# Patient Record
Sex: FEMALE | Race: WHITE | Employment: OTHER | URBAN - METROPOLITAN AREA
[De-identification: names, ages, dates, MRNs, and addresses within clinical notes are randomized per-mention and may not be internally consistent; named-entity substitution may affect disease eponyms.]

---

## 2017-03-01 ENCOUNTER — TRANSFERRED RECORDS (OUTPATIENT)
Dept: HEALTH INFORMATION MANAGEMENT | Facility: CLINIC | Age: 57
End: 2017-03-01

## 2017-04-12 ENCOUNTER — TRANSFERRED RECORDS (OUTPATIENT)
Dept: HEALTH INFORMATION MANAGEMENT | Facility: CLINIC | Age: 57
End: 2017-04-12

## 2017-12-19 LAB
CREAT SERPL-MCNC: 0.99 MG/DL (ref 0.57–1.11)
GFR SERPL CREATININE-BSD FRML MDRD: 58 ML/MIN/1.73M2
GLUCOSE SERPL-MCNC: 86 MG/DL (ref 65–100)
POTASSIUM SERPL-SCNC: 4 MMOL/L (ref 3.5–5)

## 2017-12-23 ENCOUNTER — APPOINTMENT (OUTPATIENT)
Dept: CT IMAGING | Facility: CLINIC | Age: 57
End: 2017-12-23
Attending: EMERGENCY MEDICINE
Payer: COMMERCIAL

## 2017-12-23 ENCOUNTER — HOSPITAL ENCOUNTER (EMERGENCY)
Facility: CLINIC | Age: 57
Discharge: HOME | End: 2017-12-24
Attending: EMERGENCY MEDICINE | Admitting: EMERGENCY MEDICINE
Payer: COMMERCIAL

## 2017-12-23 DIAGNOSIS — A04.72 COLITIS DUE TO CLOSTRIDIUM DIFFICILE: ICD-10-CM

## 2017-12-23 DIAGNOSIS — R19.7 BLOODY DIARRHEA: ICD-10-CM

## 2017-12-23 LAB
ALBUMIN SERPL-MCNC: 3.5 G/DL (ref 3.4–5)
ALP SERPL-CCNC: 87 U/L (ref 40–150)
ALT SERPL W P-5'-P-CCNC: 22 U/L (ref 0–50)
ANION GAP SERPL CALCULATED.3IONS-SCNC: 3 MMOL/L (ref 3–14)
APTT PPP: 40 SEC (ref 22–37)
AST SERPL W P-5'-P-CCNC: 14 U/L (ref 0–45)
BASOPHILS # BLD AUTO: 0 10E9/L (ref 0–0.2)
BASOPHILS NFR BLD AUTO: 0.4 %
BILIRUB SERPL-MCNC: 0.2 MG/DL (ref 0.2–1.3)
BUN SERPL-MCNC: 13 MG/DL (ref 7–30)
CALCIUM SERPL-MCNC: 8.7 MG/DL (ref 8.5–10.1)
CHLORIDE SERPL-SCNC: 104 MMOL/L (ref 94–109)
CO2 SERPL-SCNC: 29 MMOL/L (ref 20–32)
CREAT SERPL-MCNC: 0.94 MG/DL (ref 0.52–1.04)
CRP SERPL-MCNC: 3.1 MG/L (ref 0–8)
DIFFERENTIAL METHOD BLD: NORMAL
EOSINOPHIL # BLD AUTO: 0.3 10E9/L (ref 0–0.7)
EOSINOPHIL NFR BLD AUTO: 3.4 %
ERYTHROCYTE [DISTWIDTH] IN BLOOD BY AUTOMATED COUNT: 14.8 % (ref 10–15)
GFR SERPL CREATININE-BSD FRML MDRD: 61 ML/MIN/1.7M2
GLUCOSE SERPL-MCNC: 91 MG/DL (ref 70–99)
HCT VFR BLD AUTO: 42.6 % (ref 35–47)
HGB BLD-MCNC: 14 G/DL (ref 11.7–15.7)
IMM GRANULOCYTES # BLD: 0 10E9/L (ref 0–0.4)
IMM GRANULOCYTES NFR BLD: 0.1 %
INR PPP: 0.88 (ref 0.86–1.14)
LIPASE SERPL-CCNC: 180 U/L (ref 73–393)
LYMPHOCYTES # BLD AUTO: 1.7 10E9/L (ref 0.8–5.3)
LYMPHOCYTES NFR BLD AUTO: 20.3 %
MCH RBC QN AUTO: 29.7 PG (ref 26.5–33)
MCHC RBC AUTO-ENTMCNC: 32.9 G/DL (ref 31.5–36.5)
MCV RBC AUTO: 90 FL (ref 78–100)
MONOCYTES # BLD AUTO: 0.8 10E9/L (ref 0–1.3)
MONOCYTES NFR BLD AUTO: 9 %
NEUTROPHILS # BLD AUTO: 5.6 10E9/L (ref 1.6–8.3)
NEUTROPHILS NFR BLD AUTO: 66.8 %
PLATELET # BLD AUTO: 180 10E9/L (ref 150–450)
POTASSIUM SERPL-SCNC: 3.5 MMOL/L (ref 3.4–5.3)
PROT SERPL-MCNC: 7.1 G/DL (ref 6.8–8.8)
RBC # BLD AUTO: 4.72 10E12/L (ref 3.8–5.2)
SODIUM SERPL-SCNC: 136 MMOL/L (ref 133–144)
WBC # BLD AUTO: 8.3 10E9/L (ref 4–11)

## 2017-12-23 PROCEDURE — 83690 ASSAY OF LIPASE: CPT | Performed by: EMERGENCY MEDICINE

## 2017-12-23 PROCEDURE — 80053 COMPREHEN METABOLIC PANEL: CPT | Performed by: EMERGENCY MEDICINE

## 2017-12-23 PROCEDURE — 25000128 H RX IP 250 OP 636: Performed by: EMERGENCY MEDICINE

## 2017-12-23 PROCEDURE — 96374 THER/PROPH/DIAG INJ IV PUSH: CPT | Performed by: EMERGENCY MEDICINE

## 2017-12-23 PROCEDURE — 87493 C DIFF AMPLIFIED PROBE: CPT | Performed by: EMERGENCY MEDICINE

## 2017-12-23 PROCEDURE — 99285 EMERGENCY DEPT VISIT HI MDM: CPT | Mod: Z6 | Performed by: EMERGENCY MEDICINE

## 2017-12-23 PROCEDURE — 85610 PROTHROMBIN TIME: CPT | Performed by: EMERGENCY MEDICINE

## 2017-12-23 PROCEDURE — 85730 THROMBOPLASTIN TIME PARTIAL: CPT | Performed by: EMERGENCY MEDICINE

## 2017-12-23 PROCEDURE — 86140 C-REACTIVE PROTEIN: CPT | Performed by: EMERGENCY MEDICINE

## 2017-12-23 PROCEDURE — 96375 TX/PRO/DX INJ NEW DRUG ADDON: CPT | Performed by: EMERGENCY MEDICINE

## 2017-12-23 PROCEDURE — 99285 EMERGENCY DEPT VISIT HI MDM: CPT | Mod: 25 | Performed by: EMERGENCY MEDICINE

## 2017-12-23 PROCEDURE — 85025 COMPLETE CBC W/AUTO DIFF WBC: CPT | Performed by: EMERGENCY MEDICINE

## 2017-12-23 PROCEDURE — 25000125 ZZHC RX 250: Performed by: EMERGENCY MEDICINE

## 2017-12-23 PROCEDURE — 74177 CT ABD & PELVIS W/CONTRAST: CPT

## 2017-12-23 RX ORDER — IOPAMIDOL 755 MG/ML
500 INJECTION, SOLUTION INTRAVASCULAR ONCE
Status: COMPLETED | OUTPATIENT
Start: 2017-12-23 | End: 2017-12-23

## 2017-12-23 RX ORDER — FENTANYL CITRATE 50 UG/ML
100 INJECTION, SOLUTION INTRAMUSCULAR; INTRAVENOUS
Status: DISCONTINUED | OUTPATIENT
Start: 2017-12-23 | End: 2017-12-24 | Stop reason: HOSPADM

## 2017-12-23 RX ORDER — ONDANSETRON 2 MG/ML
4 INJECTION INTRAMUSCULAR; INTRAVENOUS EVERY 30 MIN PRN
Status: DISCONTINUED | OUTPATIENT
Start: 2017-12-23 | End: 2017-12-24 | Stop reason: HOSPADM

## 2017-12-23 RX ADMIN — SODIUM CHLORIDE 60 ML: 9 INJECTION, SOLUTION INTRAVENOUS at 23:46

## 2017-12-23 RX ADMIN — IOPAMIDOL 100 ML: 755 INJECTION, SOLUTION INTRAVENOUS at 23:45

## 2017-12-23 RX ADMIN — ONDANSETRON 4 MG: 2 INJECTION INTRAMUSCULAR; INTRAVENOUS at 22:24

## 2017-12-23 RX ADMIN — FENTANYL CITRATE 100 MCG: 50 INJECTION, SOLUTION INTRAMUSCULAR; INTRAVENOUS at 22:26

## 2017-12-23 ASSESSMENT — ENCOUNTER SYMPTOMS
HEADACHES: 0
APPETITE CHANGE: 1
BLOOD IN STOOL: 1
NAUSEA: 1
FEVER: 1
ABDOMINAL PAIN: 1
DIARRHEA: 1

## 2017-12-23 NOTE — ED AVS SNAPSHOT
[facility] Emergency Department    [address] [place] [address]    Phone:  [phone]    Fax:  [phone]                                       Amisha Lerma   MRN: [id number]    Department:  [facility] Emergency Department   Date of Visit:  12/23/2017           After Visit Summary Signature Page     I have received my discharge instructions, and my questions have been answered. I have discussed any challenges I see with this plan with the nurse or doctor.    ..........................................................................................................................................  Patient/Patient Representative Signature      ..........................................................................................................................................  Patient Representative Print Name and Relationship to Patient    ..................................................               ................................................  Date                                            Time    ..........................................................................................................................................  Reviewed by Signature/Title    ...................................................              ..............................................  Date                                                            Time

## 2017-12-23 NOTE — ED AVS SNAPSHOT
[facility] Emergency Department    [address]     [place] [address]    Phone:  [phone]    Fax:  [phone]                                       Amisha Lerma   MRN: [id number]    Department:  [facility] Emergency Department   Date of Visit:  12/23/2017           Patient Information     Date Of Birth          1960        Your diagnoses for this visit were:     Bloody diarrhea        You were seen by Mendoza Kumar MD.      Follow-up Information     Schedule an appointment as soon as possible for a visit with Wilmer Gardner.    Specialty:  Family Practice    Contact information:    [address] [address]  [phone]          Discharge Instructions         Uncertain Causes of Diarrhea (Adult)    Diarrhea is when stools are loose and watery. This can be caused by:    Viral infections    Bacterial infections    Food poisoning    Parasites    Irritable bowel syndrome (IBS)    Inflammatory bowel diseases such as ulcerative colitis, Crohn's disease, and celiac disease    Food intolerance, such as to lactose, the sugar found in milk and milk products    Reaction to medicines like antibiotics, laxatives, cancer drugs, and antacids  Along with diarrhea, you may also have:    Abdominal pain and cramping    Nausea and vomiting    Loss of bowel control    Fever and chills    Bloody stools  In some cases, antibiotics may help to treat diarrhea. You may have a stool sample test. This is done to see what is causing your diarrhea, and if antibiotics will help treat it. The results of a stool sample test may take up to 2 days. The healthcare provider may not give you antibiotics until he or she has the stool test results.  Diarrhea can cause dehydration. This is the loss of too much water and other fluids from the body. When this occurs, body fluid must be replaced. This can be done with oral rehydration solutions. Oral rehydration solutions are  available at drugstores and grocery stores without a prescription.  Home care  Follow all instructions given by your healthcare provider. Rest at home for the next 24 hours, or until you feel better. Avoid caffeine, tobacco, and alcohol. These can make diarrhea, cramping, and pain worse.  If taking medicines:    Don t take over-the-counter diarrhea or nausea medicines unless your healthcare provider tells you to.    You may use acetaminophen or NSAID medicines like ibuprofen or naproxen to reduce pain and fever. Don t use these if you have chronic liver or kidney disease, or ever had a stomach ulcer or gastrointestinal bleeding. Don't use NSAID medicines if you are already taking one for another condition (like arthritis) or are on daily aspirin therapy (such as for heart disease or after a stroke). Talk with your healthcare provider first.    If antibiotics were prescribed, be sure you take them until they are finished. Don t stop taking them even when you feel better. Antibiotics must be taken as a full course.  To prevent the spread of illness:    Remember that washing with soap and water and using alcohol-based  is the best way to prevent the spread of infection.    Clean the toilet after each use.    Wash your hands before eating.    Wash your hands before and after preparing food. Keep in mind that people with diarrhea or vomiting should not prepare food for others.    Wash your hands after using cutting boards, countertops, and knives that have been in contact with raw foods.    Wash and then peel fruits and vegetables.    Keep uncooked meats away from cooked and ready-to-eat foods.    Use a food thermometer when cooking. Cook poultry to at least 165 F (74 C). Cook ground meat (beef, veal, pork, lamb) to at least 160 F (71 C). Cook fresh beef, veal, lamb, and pork to at least 145 F (63 C).    Don t eat raw or undercooked eggs (poached or cabrera side up), poultry, meat, or unpasteurized milk and  juices.  Food and drinks  The main goal while treating vomiting or diarrhea is to prevent dehydration. This is done by taking small amounts of liquids often.    Keep in mind that liquids are more important than food right now.    Drink only small amounts of liquids at a time.    Don t force yourself to eat, especially if you are having cramping, vomiting, or diarrhea. Don t eat large amounts at a time, even if you are hungry.    If you eat, avoid fatty, greasy, spicy, or fried foods.    Don t eat dairy foods or drink milk if you have diarrhea. These can make diarrhea worse.  During the first 24 hours you can try:    Oral rehydration solutions. Do not use sports drinks. They have too much sugar and not enough electrolytes.    Soft drinks without caffeine    Ginger ale    Water (plain or flavored)    Decaf tea or coffee    Clear broth, consommé, or bouillon    Gelatin, popsicles, or frozen fruit juice bars  The second 24 hours, if you are feeling better, you can add:    Hot cereal, plain toast, bread, rolls, or crackers    Plain noodles, rice, mashed potatoes, chicken noodle soup, or rice soup    Unsweetened canned fruit (no pineapple)    Bananas  As you recover:    Limit fat intake to less than 15 grams per day. Don t eat margarine, butter, oils, mayonnaise, sauces, gravies, fried foods, peanut butter, meat, poultry, or fish.    Limit fiber. Don t eat raw or cooked vegetables, fresh fruits except bananas, or bran cereals.    Limit caffeine and chocolate.    Limit dairy.    Don t use spices or seasonings except salt.    Go back to your normal diet over time, as you feel better and your symptoms improve.    If the symptoms come back, go back to a simple diet or clear liquids.  Follow-up care  Follow up with your healthcare provider, or as advised. If a stool sample was taken or cultures were done, call the healthcare provider for the results as instructed.  Call 911  Call 911 if you have any of these  symptoms:    Trouble breathing    Confusion    Extreme drowsiness or trouble walking    Loss of consciousness    Rapid heart rate    Chest pain    Stiff neck    Seizure  When to seek medical advice  Call your healthcare provider right away if any of these occur:    Abdominal pain that gets worse    Constant lower right abdominal pain    Continued vomiting and inability to keep liquids down    Diarrhea more than 5 times a day    Blood in vomit or stool    Dark urine or no urine for 8 hours, dry mouth and tongue, tiredness, weakness, or dizziness    Drowsiness    New rash    You don t get better in 2 to 3 days    Fever of 100.4 F (38 C) or higher that doesn t get lower with medicine  Date Last Reviewed: 1/3/2016    6293-1257 [employer]. [address] [place] [address], [place] [address]. All rights reserved. This information is not intended as a substitute for professional medical care. Always follow your healthcare professional's instructions.          24 Hour Appointment Hotline       To make an appointment at any [facility], call [phone] ([phone]). If you don't have a family doctor or clinic, we will help you find one. [facility] clinics are conveniently located to serve the needs of you and your family.          ED Discharge Orders     Enteric Bacteria and Virus Panel by KELLEN Stool                    Review of your medicines      START taking        Dose / Directions Last dose taken    oxyCODONE IR 5 MG tablet   Commonly known as:  ROXICODONE   Dose:  5 mg   Quantity:  10 tablet        Take 1 tablet (5 mg) by mouth every 6 hours as needed for pain   Refills:  0          Our records show that you are taking the medicines listed below. If these are incorrect, please call your family doctor or clinic.        Dose / Directions Last dose taken    ibuprofen 800 MG tablet   Commonly known as:  ADVIL/MOTRIN   Dose:  800 mg        Take 800 mg by mouth every 8 hours as needed.   Refills:  0         LEXAPRO 20 MG tablet   Dose:  20 mg   Generic drug:  escitalopram        Take 20 mg by mouth daily.   Refills:  0        LORazepam 1 MG tablet   Commonly known as:  ATIVAN   Dose:  1 mg        Take 1 mg by mouth 2 times daily as needed.   Refills:  0        OMEPRAZOLE PO   Dose:  20 mg        Take 20 mg by mouth every other day   Refills:  0        SYNTHROID 125 MCG tablet   Dose:  125 mcg   Generic drug:  levothyroxine        Take 125 mcg by mouth daily.   Refills:  0                Prescriptions were sent or printed at these locations (1 Prescription)                   [facility]   [address] [address]    Telephone:  [phone]   Fax:  [phone]   Hours:                  Printed at Department/Unit printer (1 of 1)         oxyCODONE IR (ROXICODONE) 5 MG tablet                Procedures and tests performed during your visit     CBC with platelets differential    CRP inflammation    CT Abdomen Pelvis w Contrast    Clostridium difficile toxin B PCR    Comprehensive metabolic panel    INR    Lipase    Partial thromboplastin time    Peripheral IV catheter    UA with Microscopic      Orders Needing Specimen Collection     Ordered          12/23/17 2144  Enteric Bacteria and Virus Panel by KELLEN Stool - STAT, [facility]: STAT, Needs to be Collected     Scheduled Task Status   12/23/17 2144 Collect Enteric Bacteria and Virus Panel by KELLEN Stool Open   Order Class:  PCU Collect                  Pending Results     Date and Time Order Name Status Description    12/23/2017 2144 CT Abdomen Pelvis w Contrast Preliminary     12/23/2017 2144 Clostridium difficile toxin B PCR In process             Pending Culture Results     Date and Time Order Name Status Description    12/23/2017 2144 Clostridium difficile toxin B PCR In process             Pending Results Instructions     If you had any lab results that were not finalized at the time of your Discharge, you can call the ED Lab  Result RN at [phone]. You will be contacted by this team for any positive Lab results or changes in treatment. The nurses are available 7 days a week from 10A to 6:30P.  You can leave a message 24 hours per day and they will return your call.        Thank you for choosing [facility]       Thank you for choosing [facility] for your care. Our goal is always to provide you with excellent care. Hearing back from our patients is one way we can continue to improve our services. Please take a few minutes to complete the written survey that you may receive in the mail after you visit with us. Thank you!        [employer] Information     [employer] gives you secure access to your electronic health record. If you see a primary care provider, you can also send messages to your care team and make appointments. If you have questions, please call your primary care clinic.  If you do not have a primary care provider, please call [phone] and they will assist you.        Care EveryWhere ID     This is your Care EveryWhere ID. This could be used by other organizations to access your [facility] medical records  [id number]        Equal Access to Services     NUNO AJ : Hadii fe Tran, walissette thomas, qaisaiah kagissell pollack, elaine cuevas . So [facility] [phone].    ATENCIÓN: Si habla español, tiene a perez disposición servicios gratuitos de asistencia lingüística. Llame al [phone].    We comply with applicable federal civil rights laws and [place] laws. We do not discriminate on the basis of race, color, national origin, age, disability, sex, sexual orientation, or gender identity.            After Visit Summary       This is your record. Keep this with you and show to your community pharmacist(s) and doctor(s) at your next visit.

## 2017-12-24 VITALS
OXYGEN SATURATION: 96 % | DIASTOLIC BLOOD PRESSURE: 81 MMHG | HEIGHT: 68 IN | TEMPERATURE: 98 F | BODY MASS INDEX: 33.34 KG/M2 | WEIGHT: 220 LBS | SYSTOLIC BLOOD PRESSURE: 149 MMHG | RESPIRATION RATE: 20 BRPM

## 2017-12-24 LAB
ALBUMIN UR-MCNC: NEGATIVE MG/DL
APPEARANCE UR: CLEAR
BILIRUB UR QL STRIP: NEGATIVE
C DIFF TOX B STL QL: POSITIVE
COLOR UR AUTO: COLORLESS
GLUCOSE UR STRIP-MCNC: NEGATIVE MG/DL
HGB UR QL STRIP: NEGATIVE
KETONES UR STRIP-MCNC: NEGATIVE MG/DL
LEUKOCYTE ESTERASE UR QL STRIP: NEGATIVE
MUCOUS THREADS #/AREA URNS LPF: PRESENT /LPF
NITRATE UR QL: NEGATIVE
PH UR STRIP: 5 PH (ref 5–7)
RBC #/AREA URNS AUTO: 0 /HPF (ref 0–2)
SOURCE: ABNORMAL
SP GR UR STRIP: 1.03 (ref 1–1.03)
SPECIMEN SOURCE: ABNORMAL
SQUAMOUS #/AREA URNS AUTO: <1 /HPF (ref 0–1)
UROBILINOGEN UR STRIP-MCNC: 0 MG/DL (ref 0–2)
WBC #/AREA URNS AUTO: 0 /HPF (ref 0–2)

## 2017-12-24 PROCEDURE — 81001 URINALYSIS AUTO W/SCOPE: CPT | Performed by: EMERGENCY MEDICINE

## 2017-12-24 PROCEDURE — 96376 TX/PRO/DX INJ SAME DRUG ADON: CPT | Performed by: EMERGENCY MEDICINE

## 2017-12-24 PROCEDURE — 25000128 H RX IP 250 OP 636: Performed by: EMERGENCY MEDICINE

## 2017-12-24 RX ORDER — VANCOMYCIN HYDROCHLORIDE 125 MG/1
125 CAPSULE ORAL 4 TIMES DAILY
Qty: 56 CAPSULE | Refills: 0 | Status: SHIPPED | OUTPATIENT
Start: 2017-12-24 | End: 2017-12-24 | Stop reason: CLARIF

## 2017-12-24 RX ORDER — OXYCODONE HYDROCHLORIDE 5 MG/1
5 TABLET ORAL EVERY 6 HOURS PRN
Qty: 10 TABLET | Refills: 0 | Status: SHIPPED | OUTPATIENT
Start: 2017-12-24 | End: 2018-07-19

## 2017-12-24 RX ADMIN — FENTANYL CITRATE 100 MCG: 50 INJECTION, SOLUTION INTRAMUSCULAR; INTRAVENOUS at 00:32

## 2017-12-24 NOTE — ED PROVIDER NOTES
History     Chief Complaint   Patient presents with     Abdominal Cramping     The history is provided by the patient and the spouse.     Amisha Lerma is a 57 year old female who presents for abdominal cramping for the past three weeks. Patient has a family medical history of colon cancer. She is experiencing cramping in her lower pelvic region that waxes and weans, and it is sickening for her. She feels flushed. There is no relation to when she is eating. Patient has not been hungry, but when she does get hungry she eats a small amount. She thought that she had the Flu, and so she went to the doctor at the beginning of December. Her doctor thought her symptoms were viral, but could be a sinus infection. She was given a Zpak that she is taking. Patient is also having diarrhea, nausea, and a low grade fever. Her nausea has waxed and weaned. She has noticed mucous in her diarrhea, and that it comes in different forms. It ranges from loose to formed. She will see mostly mucous with some bowel movement. She has noticed bright red blood in stools. Sometimes she will try to have a bowel movement, but cannot. Today, she had 10 small stools. She was seen by a CNP on 12-. Patient completed blood tests, and completed a stool sample kit that she turned in the next morning. She has noticed more blood in her bowel movements. Her last colonoscopy was 3 years ago. Patient got her test results from her appointment on 12- that were negative. She was directed to take pain reliever and use an antidiarrheal. Patient chose not to take the antidiarrheal, and the pain reliever is not helping. She was on antibiotics twice for a tooth infection, and once for a sinus infection recently where she was given a Zpak. She denies body aches and headaches.      Patient Active Problem List   Diagnosis     Acute appendicitis with peritoneal abscess     Fever and chills     Hypothyroidism     Major depression in complete remission  "(H)     Anxiety     Tobacco abuse     Abdominal abscess (H)     Acute appendicitis with generalized peritonitis     Family history of ischemic heart disease     Family history of colon cancer     Family history of skin cancer     Family history of high cholesterol     CARDIOVASCULAR SCREENING; LDL GOAL LESS THAN 160     Cholelithiasis     Splenomegaly     Acute bronchitis     Atypical chest pain     Past Medical History:   Diagnosis Date     Anxiety      Cholelithiasis 10/1/2013     Depressive disorder      Dysmenorrhea      Menometrorrhagia      Splenomegaly 10/1/2013     Substance abuse      Thyroid disease        Past Surgical History:   Procedure Laterality Date     ABDOMEN SURGERY       GYN SURGERY      ablation     LAPAROSCOPIC APPENDECTOMY  7/19/2012    Procedure: LAPAROSCOPIC APPENDECTOMY;  laparoscopic appendectomy, abscess drainage, lysis of adhesions;  Surgeon: Jeffrey Adair MD;  Location: [facility]     LAPAROSCOPIC LYSIS ADHESIONS  7/19/2012    Procedure: LAPAROSCOPIC LYSIS ADHESIONS;;  Surgeon: Jeffrey Adair MD;  Location: [facility] OR     ORTHOPEDIC SURGERY         Family History   Problem Relation Age of Onset     CANCER Brother      lung and brain       Social History   Substance Use Topics     Smoking status: Current Every Day Smoker     Packs/day: 1.00     Years: 30.00     Smokeless tobacco: Never Used     Alcohol use No      Comment: Alcohol free for 19 years          There is no immunization history on file for this patient.       Allergies   Allergen Reactions     Dilaudid [Hydromorphone Hcl]      Makes her \"pukey\" after surgery     Nickel        Current Outpatient Prescriptions   Medication Sig Dispense Refill     oxyCODONE IR (ROXICODONE) 5 MG tablet Take 1 tablet (5 mg) by mouth every 6 hours as needed for pain 10 tablet 0     vancomycin (VANOCIN) 50 mg/mL LIQD solution Take 2.5 mLs (125 mg) by mouth 4 times daily for 14 days 140 mL 0     OMEPRAZOLE PO Take 20 mg by mouth every other " "day       lorazepam (ATIVAN) 1 MG tablet Take 1 mg by mouth 2 times daily as needed.       ibuprofen (ADVIL,MOTRIN) 800 MG tablet Take 800 mg by mouth every 8 hours as needed.       escitalopram (LEXAPRO) 20 MG tablet Take 20 mg by mouth daily.       levothyroxine (SYNTHROID) 125 MCG tablet Take 125 mcg by mouth daily.       Review of Systems   Constitutional: Positive for appetite change and fever.        POSITIVE flushed   Gastrointestinal: Positive for abdominal pain (cramping in lower pelvic region), blood in stool, diarrhea and nausea.   Musculoskeletal:        NEGATIVE body aches   Neurological: Negative for headaches.   All other systems reviewed and are negative.      Physical Exam   BP: 149/81  Heart Rate: 79  Temp: 98  F (36.7  C)  Resp: 20  Height: 172.7 cm (5' 8\")  Weight: 99.8 kg (220 lb)  SpO2: 99 %      Physical Exam   Constitutional: She is oriented to person, place, and time. No distress.   HENT:   Head: Normocephalic and atraumatic.   Eyes: Conjunctivae and EOM are normal.   Neck: Normal range of motion.   Cardiovascular: Normal rate, regular rhythm, normal heart sounds and intact distal pulses.    No murmur heard.  Pulmonary/Chest: Effort normal and breath sounds normal. No respiratory distress. She has no wheezes. She has no rales. She exhibits no tenderness.   Abdominal: Soft. Bowel sounds are normal. She exhibits no distension. There is no tenderness.   Musculoskeletal: She exhibits no edema.   Neurological: She is alert and oriented to person, place, and time.   Skin: Skin is warm and dry. No rash noted. She is not diaphoretic. No pallor.   Psychiatric: She has a normal mood and affect. Her behavior is normal.   Nursing note and vitals reviewed.      ED Course     ED Course     Procedures          Results for orders placed or performed during the hospital encounter of 12/23/17 (from the past 48 hour(s))   CBC with platelets differential   Result Value Ref Range    WBC 8.3 4.0 - 11.0 10e9/L    " RBC Count 4.72 3.8 - 5.2 10e12/L    Hemoglobin 14.0 11.7 - 15.7 g/dL    Hematocrit 42.6 35.0 - 47.0 %    MCV 90 78 - 100 fl    MCH 29.7 26.5 - 33.0 pg    MCHC 32.9 31.5 - 36.5 g/dL    RDW 14.8 10.0 - 15.0 %    Platelet Count 180 150 - 450 10e9/L    Diff Method Automated Method     % Neutrophils 66.8 %    % Lymphocytes 20.3 %    % Monocytes 9.0 %    % Eosinophils 3.4 %    % Basophils 0.4 %    % Immature Granulocytes 0.1 %    Absolute Neutrophil 5.6 1.6 - 8.3 10e9/L    Absolute Lymphocytes 1.7 0.8 - 5.3 10e9/L    Absolute Monocytes 0.8 0.0 - 1.3 10e9/L    Absolute Eosinophils 0.3 0.0 - 0.7 10e9/L    Absolute Basophils 0.0 0.0 - 0.2 10e9/L    Abs Immature Granulocytes 0.0 0 - 0.4 10e9/L   Comprehensive metabolic panel   Result Value Ref Range    Sodium 136 133 - 144 mmol/L    Potassium 3.5 3.4 - 5.3 mmol/L    Chloride 104 94 - 109 mmol/L    Carbon Dioxide 29 20 - 32 mmol/L    Anion Gap 3 3 - 14 mmol/L    Glucose 91 70 - 99 mg/dL    Urea Nitrogen 13 7 - 30 mg/dL    Creatinine 0.94 0.52 - 1.04 mg/dL    GFR Estimate 61 >60 mL/min/1.7m2    GFR Estimate If Black 74 >60 mL/min/1.7m2    Calcium 8.7 8.5 - 10.1 mg/dL    Bilirubin Total 0.2 0.2 - 1.3 mg/dL    Albumin 3.5 3.4 - 5.0 g/dL    Protein Total 7.1 6.8 - 8.8 g/dL    Alkaline Phosphatase 87 40 - 150 U/L    ALT 22 0 - 50 U/L    AST 14 0 - 45 U/L   Lipase   Result Value Ref Range    Lipase 180 73 - 393 U/L   CRP inflammation   Result Value Ref Range    CRP Inflammation 3.1 0.0 - 8.0 mg/L   INR   Result Value Ref Range    INR 0.88 0.86 - 1.14   Partial thromboplastin time   Result Value Ref Range    PTT 40 (H) 22 - 37 sec   Clostridium difficile toxin B PCR   Result Value Ref Range    Specimen Description Feces     C Diff Toxin B PCR Positive (A) NEG^Negative   CT Abdomen Pelvis w Contrast    Narrative    CT ABDOMEN AND PELVIS WITH CONTRAST   12/23/2017 11:57 PM     HISTORY: Bloody diarrhea for 3 weeks.    COMPARISON: None.    TECHNIQUE: Following the uneventful  administration of 100ml Isovue 370  intravenous contrast, helical sections were acquired from the top of  the diaphragm through the pubic symphysis. Coronal reconstructions  were generated. Radiation dose for this scan was reduced using  automated exposure control, adjustment of the mA and/or kV according  to the patient's size, or iterative reconstruction technique.    FINDINGS:     Abdomen: The liver, spleen, pancreas, adrenal glands and kidneys are  unremarkable. A few gallstones in the gallbladder. No enlarged lymph  nodes or free fluid in the upper abdomen. Atherosclerotic  calcification in the abdominal aorta.    Scan through the lower chest is unremarkable.    Pelvis: The small and large bowel are normal in caliber. The appendix  is not visualized. No convincing bowel wall thickening, pneumatosis or  free intraperitoneal gas. The uterus is present. No enlarged lymph  nodes or free fluid in the pelvis.       Impression    IMPRESSION:   1. No cause of acute pain identified in the abdomen or pelvis.  2. Cholelithiasis.    LYUDMILA SHEIKH MD   UA with Microscopic   Result Value Ref Range    Color Urine Colorless     Appearance Urine Clear     Glucose Urine Negative NEG^Negative mg/dL    Bilirubin Urine Negative NEG^Negative    Ketones Urine Negative NEG^Negative mg/dL    Specific Gravity Urine 1.031 1.003 - 1.035    Blood Urine Negative NEG^Negative    pH Urine 5.0 5.0 - 7.0 pH    Protein Albumin Urine Negative NEG^Negative mg/dL    Urobilinogen mg/dL 0.0 0.0 - 2.0 mg/dL    Nitrite Urine Negative NEG^Negative    Leukocyte Esterase Urine Negative NEG^Negative    Source Midstream Urine     WBC Urine 0 0 - 2 /HPF    RBC Urine 0 0 - 2 /HPF    Squamous Epithelial /HPF Urine <1 0 - 1 /HPF    Mucous Urine Present (A) NEG^Negative /LPF              Results for orders placed or performed during the hospital encounter of 12/23/17 (from the past 24 hour(s))   CBC with platelets differential   Result Value Ref Range    WBC  8.3 4.0 - 11.0 10e9/L    RBC Count 4.72 3.8 - 5.2 10e12/L    Hemoglobin 14.0 11.7 - 15.7 g/dL    Hematocrit 42.6 35.0 - 47.0 %    MCV 90 78 - 100 fl    MCH 29.7 26.5 - 33.0 pg    MCHC 32.9 31.5 - 36.5 g/dL    RDW 14.8 10.0 - 15.0 %    Platelet Count 180 150 - 450 10e9/L    Diff Method Automated Method     % Neutrophils 66.8 %    % Lymphocytes 20.3 %    % Monocytes 9.0 %    % Eosinophils 3.4 %    % Basophils 0.4 %    % Immature Granulocytes 0.1 %    Absolute Neutrophil 5.6 1.6 - 8.3 10e9/L    Absolute Lymphocytes 1.7 0.8 - 5.3 10e9/L    Absolute Monocytes 0.8 0.0 - 1.3 10e9/L    Absolute Eosinophils 0.3 0.0 - 0.7 10e9/L    Absolute Basophils 0.0 0.0 - 0.2 10e9/L    Abs Immature Granulocytes 0.0 0 - 0.4 10e9/L   Comprehensive metabolic panel   Result Value Ref Range    Sodium 136 133 - 144 mmol/L    Potassium 3.5 3.4 - 5.3 mmol/L    Chloride 104 94 - 109 mmol/L    Carbon Dioxide 29 20 - 32 mmol/L    Anion Gap 3 3 - 14 mmol/L    Glucose 91 70 - 99 mg/dL    Urea Nitrogen 13 7 - 30 mg/dL    Creatinine 0.94 0.52 - 1.04 mg/dL    GFR Estimate 61 >60 mL/min/1.7m2    GFR Estimate If Black 74 >60 mL/min/1.7m2    Calcium 8.7 8.5 - 10.1 mg/dL    Bilirubin Total 0.2 0.2 - 1.3 mg/dL    Albumin 3.5 3.4 - 5.0 g/dL    Protein Total 7.1 6.8 - 8.8 g/dL    Alkaline Phosphatase 87 40 - 150 U/L    ALT 22 0 - 50 U/L    AST 14 0 - 45 U/L   Lipase   Result Value Ref Range    Lipase 180 73 - 393 U/L   CRP inflammation   Result Value Ref Range    CRP Inflammation 3.1 0.0 - 8.0 mg/L   INR   Result Value Ref Range    INR 0.88 0.86 - 1.14   Partial thromboplastin time   Result Value Ref Range    PTT 40 (H) 22 - 37 sec   Clostridium difficile toxin B PCR   Result Value Ref Range    Specimen Description Feces     C Diff Toxin B PCR Positive (A) NEG^Negative   CT Abdomen Pelvis w Contrast    Narrative    CT ABDOMEN AND PELVIS WITH CONTRAST   12/23/2017 11:57 PM     HISTORY: Bloody diarrhea for 3 weeks.    COMPARISON: None.    TECHNIQUE: Following  the uneventful administration of 100ml Isovue 370  intravenous contrast, helical sections were acquired from the top of  the diaphragm through the pubic symphysis. Coronal reconstructions  were generated. Radiation dose for this scan was reduced using  automated exposure control, adjustment of the mA and/or kV according  to the patient's size, or iterative reconstruction technique.    FINDINGS:     Abdomen: The liver, spleen, pancreas, adrenal glands and kidneys are  unremarkable. A few gallstones in the gallbladder. No enlarged lymph  nodes or free fluid in the upper abdomen. Atherosclerotic  calcification in the abdominal aorta.    Scan through the lower chest is unremarkable.    Pelvis: The small and large bowel are normal in caliber. The appendix  is not visualized. No convincing bowel wall thickening, pneumatosis or  free intraperitoneal gas. The uterus is present. No enlarged lymph  nodes or free fluid in the pelvis.       Impression    IMPRESSION:   1. No cause of acute pain identified in the abdomen or pelvis.  2. Cholelithiasis.    LYUDMILA SHEIKH MD   UA with Microscopic   Result Value Ref Range    Color Urine Colorless     Appearance Urine Clear     Glucose Urine Negative NEG^Negative mg/dL    Bilirubin Urine Negative NEG^Negative    Ketones Urine Negative NEG^Negative mg/dL    Specific Gravity Urine 1.031 1.003 - 1.035    Blood Urine Negative NEG^Negative    pH Urine 5.0 5.0 - 7.0 pH    Protein Albumin Urine Negative NEG^Negative mg/dL    Urobilinogen mg/dL 0.0 0.0 - 2.0 mg/dL    Nitrite Urine Negative NEG^Negative    Leukocyte Esterase Urine Negative NEG^Negative    Source Midstream Urine     WBC Urine 0 0 - 2 /HPF    RBC Urine 0 0 - 2 /HPF    Squamous Epithelial /HPF Urine <1 0 - 1 /HPF    Mucous Urine Present (A) NEG^Negative /LPF       Medications   iohexol (OMNIPAQUE) solution 50 mL (50 mLs Oral Given 12/23/17 2211)   sodium chloride 0.9 % bag 500mL for CT scan flush use (60 mLs Intravenous Given  12/23/17 2346)   iopamidol (ISOVUE-370) solution 500 mL (100 mLs Intravenous Given 12/23/17 2345)   sodium chloride (PF) 0.9% PF flush 3 mL (10 mLs Intracatheter Given 12/23/17 2344)         Assessments & Plan (with Medical Decision Making)  Amisha Lerma is a 57-year-old female to the ED for evaluation of ongoing diarrhea which at times is bloody that started around the beginning of December of this year.  Patient was recently on a course of antibiotics (azithromycin) for sinus infection just prior to the onset of diarrhea.  On average, she is reportedly had 7-10 loose bowel movements a day, more recently she has had bloody diarrhea.  She complains of intense cramping throughout the abdomen but not associated with eating or movement.  She was seen by her primary care provider at [facility] in [place] where she had labs and stool studies done.  It appears the only stool study that was done was Campylobacter which was negative.  On exam here, I am unable to find any significant abnormalities including abdominal pain with any palpation, hyperactive or hypoactive bowel sounds, no guarding or rebound tenderness.  Overall, her exam is unremarkable.  Labs were obtained and a normal CBC and comprehensive metabolic panel.  Her lipase, PT/INR, and CRP are normal.  Her PTT is elevated at 40.  Urinalysis is unremarkable.  A CT of the abdomen and pelvis was performed demonstrating cholelithiasis without cholecystitis with the remainder of her abdomen appearing to be benign and no identifiable abnormalities to account for her abdominal pain.  Her C. difficile toxin returns positive signifying that she has C. difficile colitis.  We will started on oral vancomycin liquid 125 mg per 5 mL with a dose of 5 mL by mouth 4 times a day for the next 14 days.  In addition, we put her on a short course of oxycodone for pain control.  She will need to follow-up with her primary care provider at the completion of the antibiotics for  retesting of C. difficile toxin to ensure that we cleared her from this infection.  Patient understands the plan and is suitable for discharge in satisfactory condition.     I have reviewed the nursing notes.    I have reviewed the findings, diagnosis, plan and need for follow up with the patient.       Discharge Medication List as of 12/24/2017  2:00 AM      START taking these medications    Details   oxyCODONE IR (ROXICODONE) 5 MG tablet Take 1 tablet (5 mg) by mouth every 6 hours as needed for pain, Disp-10 tablet, R-0, Local Print             Final diagnoses:   Bloody diarrhea   Colitis due to Clostridium difficile     This document serves as a record of services personally performed by Mendoza Kumar MD. It was created on their behalf by Kellie Michelle, a trained medical scribe. The creation of this record is based on the provider's personal observations and the statements of the patient. This document has been checked and approved by the attending provider.    Note: Chart documentation done in part with Dragon Voice Recognition software. Although reviewed after completion, some word and grammatical errors may remain.    12/23/2017   [facility] EMERGENCY DEPARTMENT     Mendoza Kumar MD  12/24/17 0507

## 2017-12-24 NOTE — DISCHARGE INSTRUCTIONS
Uncertain Causes of Diarrhea (Adult)    Diarrhea is when stools are loose and watery. This can be caused by:    Viral infections    Bacterial infections    Food poisoning    Parasites    Irritable bowel syndrome (IBS)    Inflammatory bowel diseases such as ulcerative colitis, Crohn's disease, and celiac disease    Food intolerance, such as to lactose, the sugar found in milk and milk products    Reaction to medicines like antibiotics, laxatives, cancer drugs, and antacids  Along with diarrhea, you may also have:    Abdominal pain and cramping    Nausea and vomiting    Loss of bowel control    Fever and chills    Bloody stools  In some cases, antibiotics may help to treat diarrhea. You may have a stool sample test. This is done to see what is causing your diarrhea, and if antibiotics will help treat it. The results of a stool sample test may take up to 2 days. The healthcare provider may not give you antibiotics until he or she has the stool test results.  Diarrhea can cause dehydration. This is the loss of too much water and other fluids from the body. When this occurs, body fluid must be replaced. This can be done with oral rehydration solutions. Oral rehydration solutions are available at drugstores and grocery stores without a prescription.  Home care  Follow all instructions given by your healthcare provider. Rest at home for the next 24 hours, or until you feel better. Avoid caffeine, tobacco, and alcohol. These can make diarrhea, cramping, and pain worse.  If taking medicines:    Don t take over-the-counter diarrhea or nausea medicines unless your healthcare provider tells you to.    You may use acetaminophen or NSAID medicines like ibuprofen or naproxen to reduce pain and fever. Don t use these if you have chronic liver or kidney disease, or ever had a stomach ulcer or gastrointestinal bleeding. Don't use NSAID medicines if you are already taking one for another condition (like arthritis) or are on daily  aspirin therapy (such as for heart disease or after a stroke). Talk with your healthcare provider first.    If antibiotics were prescribed, be sure you take them until they are finished. Don t stop taking them even when you feel better. Antibiotics must be taken as a full course.  To prevent the spread of illness:    Remember that washing with soap and water and using alcohol-based  is the best way to prevent the spread of infection.    Clean the toilet after each use.    Wash your hands before eating.    Wash your hands before and after preparing food. Keep in mind that people with diarrhea or vomiting should not prepare food for others.    Wash your hands after using cutting boards, countertops, and knives that have been in contact with raw foods.    Wash and then peel fruits and vegetables.    Keep uncooked meats away from cooked and ready-to-eat foods.    Use a food thermometer when cooking. Cook poultry to at least 165 F (74 C). Cook ground meat (beef, veal, pork, lamb) to at least 160 F (71 C). Cook fresh beef, veal, lamb, and pork to at least 145 F (63 C).    Don t eat raw or undercooked eggs (poached or cabrera side up), poultry, meat, or unpasteurized milk and juices.  Food and drinks  The main goal while treating vomiting or diarrhea is to prevent dehydration. This is done by taking small amounts of liquids often.    Keep in mind that liquids are more important than food right now.    Drink only small amounts of liquids at a time.    Don t force yourself to eat, especially if you are having cramping, vomiting, or diarrhea. Don t eat large amounts at a time, even if you are hungry.    If you eat, avoid fatty, greasy, spicy, or fried foods.    Don t eat dairy foods or drink milk if you have diarrhea. These can make diarrhea worse.  During the first 24 hours you can try:    Oral rehydration solutions. Do not use sports drinks. They have too much sugar and not enough electrolytes.    Soft drinks without  caffeine    Ginger ale    Water (plain or flavored)    Decaf tea or coffee    Clear broth, consommé, or bouillon    Gelatin, popsicles, or frozen fruit juice bars  The second 24 hours, if you are feeling better, you can add:    Hot cereal, plain toast, bread, rolls, or crackers    Plain noodles, rice, mashed potatoes, chicken noodle soup, or rice soup    Unsweetened canned fruit (no pineapple)    Bananas  As you recover:    Limit fat intake to less than 15 grams per day. Don t eat margarine, butter, oils, mayonnaise, sauces, gravies, fried foods, peanut butter, meat, poultry, or fish.    Limit fiber. Don t eat raw or cooked vegetables, fresh fruits except bananas, or bran cereals.    Limit caffeine and chocolate.    Limit dairy.    Don t use spices or seasonings except salt.    Go back to your normal diet over time, as you feel better and your symptoms improve.    If the symptoms come back, go back to a simple diet or clear liquids.  Follow-up care  Follow up with your healthcare provider, or as advised. If a stool sample was taken or cultures were done, call the healthcare provider for the results as instructed.  Call 911  Call 911 if you have any of these symptoms:    Trouble breathing    Confusion    Extreme drowsiness or trouble walking    Loss of consciousness    Rapid heart rate    Chest pain    Stiff neck    Seizure  When to seek medical advice  Call your healthcare provider right away if any of these occur:    Abdominal pain that gets worse    Constant lower right abdominal pain    Continued vomiting and inability to keep liquids down    Diarrhea more than 5 times a day    Blood in vomit or stool    Dark urine or no urine for 8 hours, dry mouth and tongue, tiredness, weakness, or dizziness    Drowsiness    New rash    You don t get better in 2 to 3 days    Fever of 100.4 F (38 C) or higher that doesn t get lower with medicine  Date Last Reviewed: 1/3/2016    3112-9393 [employer]. [address] [place] [address]  [place] [address]. All rights reserved. This information is not intended as a substitute for professional medical care. Always follow your healthcare professional's instructions.

## 2017-12-24 NOTE — ED NOTES
IV removed by pt.  Sent home a stool sample kit and instructions for pt collect. Reviewed discharge instructions with pt.  No additional questions or concerns.

## 2017-12-24 NOTE — ED NOTES
Pt reports abd cramping and diarrhea that started the beginning of December, has been to the doctor multiple times without improvement. Reports she has also been having bright red blood with stools. Over the past month has been on an antibiotic. States today she has had about 10 small soft stools.

## 2017-12-29 ENCOUNTER — TELEPHONE (OUTPATIENT)
Dept: FAMILY MEDICINE | Facility: CLINIC | Age: 57
End: 2017-12-29

## 2017-12-29 ENCOUNTER — OFFICE VISIT (OUTPATIENT)
Dept: FAMILY MEDICINE | Facility: CLINIC | Age: 57
End: 2017-12-29
Payer: COMMERCIAL

## 2017-12-29 VITALS
HEIGHT: 68 IN | WEIGHT: 216.5 LBS | OXYGEN SATURATION: 100 % | TEMPERATURE: 97.5 F | DIASTOLIC BLOOD PRESSURE: 76 MMHG | HEART RATE: 91 BPM | SYSTOLIC BLOOD PRESSURE: 116 MMHG | BODY MASS INDEX: 32.81 KG/M2

## 2017-12-29 DIAGNOSIS — A04.72 C. DIFFICILE COLITIS: Primary | ICD-10-CM

## 2017-12-29 PROCEDURE — 99214 OFFICE O/P EST MOD 30 MIN: CPT | Performed by: FAMILY MEDICINE

## 2017-12-29 RX ORDER — DICYCLOMINE HCL 20 MG
20 TABLET ORAL 4 TIMES DAILY PRN
Qty: 20 TABLET | Refills: 1 | Status: SHIPPED | OUTPATIENT
Start: 2017-12-29 | End: 2018-07-19

## 2017-12-29 RX ORDER — OXYCODONE AND ACETAMINOPHEN 5; 325 MG/1; MG/1
1 TABLET ORAL EVERY 4 HOURS PRN
Qty: 20 TABLET | Refills: 0 | Status: SHIPPED | OUTPATIENT
Start: 2017-12-29 | End: 2018-01-02 | Stop reason: SINTOL

## 2017-12-29 NOTE — PROGRESS NOTES
SUBJECTIVE:   Amisha Lerma is a 57 year old female who presents to clinic today for the following health issues:  Patient was seen 12/23/2017 at our ED and Dx with C. Diff. Started on a 14 day course   Of vancomycin and instructed to follow up after that.    Patient is still having the ABD pain today. Patient c/o constipation. She is still taking the Vancomycin.     ED/UC Followup:    Facility:  [facility]  Date of visit: 12/23/2017  Reason for visit: C. Diff   Current Status: follow up              Problem list and histories reviewed & adjusted, as indicated.  Additional history: as documented        Reviewed and updated as needed this visit by clinical staff       Reviewed and updated as needed this visit by Provider        SUBJECTIVE:  Amisha  is a 57 year old female who presents for: Follow-up of her emergency room visit she is being treated for C. difficile diarrhea.  Her diarrhea is better.  She still having some abdominal discomfort.  She is in the middle of a 14 day course of vancomycin.  Emergency room report was reviewed and she has had trouble with diarrhea since the beginning of December.  She was on courses of antibiotics just prior to the diarrhea onset.  She is eating now.  Even reports some constipation.    Past Medical History:   Diagnosis Date     Anxiety      Cholelithiasis 10/1/2013     Depressive disorder      Dysmenorrhea      Menometrorrhagia      Splenomegaly 10/1/2013     Substance abuse      Thyroid disease      Past Surgical History:   Procedure Laterality Date     ABDOMEN SURGERY       GYN SURGERY      ablation     LAPAROSCOPIC APPENDECTOMY  7/19/2012    Procedure: LAPAROSCOPIC APPENDECTOMY;  laparoscopic appendectomy, abscess drainage, lysis of adhesions;  Surgeon: Jeffrey Adair MD;  Location:  OR     LAPAROSCOPIC LYSIS ADHESIONS  7/19/2012    Procedure: LAPAROSCOPIC LYSIS ADHESIONS;;  Surgeon: Jeffrey Adair MD;  Location:  OR     ORTHOPEDIC SURGERY       Social  "History   Substance Use Topics     Smoking status: Current Every Day Smoker     Packs/day: 1.00     Years: 30.00     Smokeless tobacco: Never Used     Alcohol use No      Comment: Alcohol free for 19 years     Current Outpatient Prescriptions   Medication Sig Dispense Refill     dicyclomine (BENTYL) 20 MG tablet Take 1 tablet (20 mg) by mouth 4 times daily as needed 20 tablet 1     vancomycin (VANOCIN) 50 mg/mL LIQD solution Take 2.5 mLs (125 mg) by mouth 4 times daily for 14 days 140 mL 0     OMEPRAZOLE PO Take 20 mg by mouth every other day       lorazepam (ATIVAN) 1 MG tablet Take 1 mg by mouth 2 times daily as needed.       ibuprofen (ADVIL,MOTRIN) 800 MG tablet Take 800 mg by mouth every 8 hours as needed.       escitalopram (LEXAPRO) 20 MG tablet Take 20 mg by mouth daily.       levothyroxine (SYNTHROID) 125 MCG tablet Take 125 mcg by mouth daily.       oxyCODONE IR (ROXICODONE) 5 MG tablet Take 1 tablet (5 mg) by mouth every 6 hours as needed for pain (Patient not taking: Reported on 12/29/2017) 10 tablet 0       REVIEW OF SYSTEMS:   5 point ROS negative except as noted above in HPI, including Gen., Resp, CV, GI &  system review.     OBJECTIVE:  Vitals: /76 (BP Location: Right arm, Patient Position: Chair, Cuff Size: Adult Large)  Pulse 91  Temp 97.5  F (36.4  C) (Temporal)  Ht 5' 8\" (1.727 m)  Wt 216 lb 8 oz (98.2 kg)  SpO2 100%  BMI 32.92 kg/m2  BMI= Body mass index is 32.92 kg/(m^2).  She appears in no distress.  Mucous membranes are moist.  Neck supple no adenopathy.  Lungs are clear.  Heart regular rhythm no murmur.  Abdomen soft bowel sounds present just some generalized tenderness to palpation no masses no distention.  Skin is clear.  Extremities normal.  Reviewed labs.  Reviewed CT scan.    ASSESSMENT:  C. difficile diarrhea #2 abdominal pain    PLAN:  Continue on her vancomycin.  She was offered some Percocet for pain but stated this made her sick.  So we had to change it to Vicodin " apparently she can tolerate better.  She is to follow up with the end of her course of vancomycin to recheck stool samples.  Report back of worsening.  Her vitals and physical exam were unremarkable today.        Jeffrey Gardner MD  [facility]

## 2017-12-29 NOTE — NURSING NOTE
"Chief Complaint   Patient presents with     RECHECK     C. Diff        Initial /76 (BP Location: Right arm, Patient Position: Chair, Cuff Size: Adult Large)  Pulse 91  Temp 97.5  F (36.4  C) (Temporal)  Ht 5' 8\" (1.727 m)  Wt 216 lb 8 oz (98.2 kg)  SpO2 100%  BMI 32.92 kg/m2 Estimated body mass index is 32.92 kg/(m^2) as calculated from the following:    Height as of this encounter: 5' 8\" (1.727 m).    Weight as of this encounter: 216 lb 8 oz (98.2 kg).  Medication Reconciliation: complete  "

## 2017-12-29 NOTE — MR AVS SNAPSHOT
"              After Visit Summary   12/29/2017    Amisha Lerma    MRN: [id number]           Patient Information     Date Of Birth          1960        Visit Information        Provider Department      12/29/2017 2:40 PM Jeffrey Gardner MD [facility]        Today's Diagnoses     C. difficile colitis    -  1       Follow-ups after your visit        Who to contact     If you have questions or need follow up information about today's clinic visit or your schedule please contact [facility] directly at [phone].  Normal or non-critical lab and imaging results will be communicated to you by MyChart, letter or phone within 4 business days after the clinic has received the results. If you do not hear from us within 7 days, please contact the clinic through [employer] or phone. If you have a critical or abnormal lab result, we will notify you by phone as soon as possible.  Submit refill requests through [employer] or call your pharmacy and they will forward the refill request to us. Please allow 3 business days for your refill to be completed.          Additional Information About Your Visit        MyChart Information     [employer] gives you secure access to your electronic health record. If you see a primary care provider, you can also send messages to your care team and make appointments. If you have questions, please call your primary care clinic.  If you do not have a primary care provider, please call [phone] and they will assist you.        Care EveryWhere ID     This is your Care EveryWhere ID. This could be used by other organizations to access your [facility] medical records  [id number]        Your Vitals Were     Pulse Temperature Height Pulse Oximetry BMI (Body Mass Index)       91 97.5  F (36.4  C) (Temporal) 5' 8\" (1.727 m) 100% 32.92 kg/m2        Blood Pressure from Last 3 Encounters:   12/29/17 116/76   12/23/17 149/81   02/23/16 108/66    Weight from Last 3 Encounters: "   12/29/17 216 lb 8 oz (98.2 kg)   12/23/17 220 lb (99.8 kg)   01/02/15 217 lb (98.4 kg)              Today, you had the following     No orders found for display         Today's Medication Changes          These changes are accurate as of: 12/29/17 11:59 PM.  If you have any questions, ask your nurse or doctor.               Start taking these medicines.        Dose/Directions    dicyclomine 20 MG tablet   Commonly known as:  BENTYL   Used for:  C. difficile colitis   Started by:  Jeffrey Gardner MD        Dose:  20 mg   Take 1 tablet (20 mg) by mouth 4 times daily as needed   Quantity:  20 tablet   Refills:  1       HYDROcodone-acetaminophen 5-325 MG per tablet   Commonly known as:  NORCO   Used for:  C. difficile colitis   Started by:  Jeffrey Gardner MD        Dose:  1-2 tablet   Take 1-2 tablets by mouth every 4 hours as needed for moderate to severe pain   Quantity:  20 tablet   Refills:  0            Where to get your medicines      Some of these will need a paper prescription and others can be bought over the counter.  Ask your nurse if you have questions.     Bring a paper prescription for each of these medications     dicyclomine 20 MG tablet    HYDROcodone-acetaminophen 5-325 MG per tablet                Primary Care Provider Office Phone # Fax #    Wilmer Gardner [phone]       [place] [address]        Equal Access to Services     VENICE AJ AH: Hadii fe ku hadasho Soomaali, waaxda luqadaha, qaybta kaalmada adeegyada, elaine gonzalez. So [facility] [phone].    ATENCIÓN: Si habla español, tiene a perez disposición servicios gratuitos de asistencia lingüística. Llame al [phone].    We comply with applicable federal civil rights laws and [place] laws. We do not discriminate on the basis of race, color, national origin, age, disability, sex, sexual orientation, or gender identity.            Thank you!     Thank you for  choosing [facility]  for your care. Our goal is always to provide you with excellent care. Hearing back from our patients is one way we can continue to improve our services. Please take a few minutes to complete the written survey that you may receive in the mail after your visit with us. Thank you!             Your Updated Medication List - Protect others around you: Learn how to safely use, store and throw away your medicines at www.disposemymeds.org.          This list is accurate as of: 12/29/17 11:59 PM.  Always use your most recent med list.                   Brand Name Dispense Instructions for use Diagnosis    dicyclomine 20 MG tablet    BENTYL    20 tablet    Take 1 tablet (20 mg) by mouth 4 times daily as needed    C. difficile colitis       HYDROcodone-acetaminophen 5-325 MG per tablet    NORCO    20 tablet    Take 1-2 tablets by mouth every 4 hours as needed for moderate to severe pain    C. difficile colitis       ibuprofen 800 MG tablet    ADVIL/MOTRIN     Take 800 mg by mouth every 8 hours as needed.        LEXAPRO 20 MG tablet   Generic drug:  escitalopram      Take 20 mg by mouth daily.        LORazepam 1 MG tablet    ATIVAN     Take 1 mg by mouth 2 times daily as needed.        OMEPRAZOLE PO      Take 20 mg by mouth every other day        oxyCODONE IR 5 MG tablet    ROXICODONE    10 tablet    Take 1 tablet (5 mg) by mouth every 6 hours as needed for pain        SYNTHROID 125 MCG tablet   Generic drug:  levothyroxine      Take 125 mcg by mouth daily.        vancomycin 50 mg/mL Liqd solution    VANOCIN    140 mL    Take 2.5 mLs (125 mg) by mouth 4 times daily for 14 days

## 2017-12-30 NOTE — TELEPHONE ENCOUNTER
Reason for call:  Other   Patient called regarding (reason for call): Patient calling regarding visit today, medication that prescribe, (oxycodone) is upsetting pt stomach. She would like to try the Vicodin, In the past Vicodin has been successful.    Additional comments: No.      Phone number to reach patient:  Home number on file [phone] (home)    Best Time:  Anytime    Can we leave a detailed message on this number?  YES

## 2018-01-02 RX ORDER — HYDROCODONE BITARTRATE AND ACETAMINOPHEN 5; 325 MG/1; MG/1
1-2 TABLET ORAL EVERY 4 HOURS PRN
Qty: 20 TABLET | Refills: 0 | Status: SHIPPED | OUTPATIENT
Start: 2018-01-02 | End: 2018-07-19

## 2018-01-02 NOTE — TELEPHONE ENCOUNTER
Amisha called back stating that should would like to use the [facility]-also stated that her  Jonatan will be picking this up

## 2018-01-02 NOTE — TELEPHONE ENCOUNTER
Lm for Amisha to return my to let us know what pharmacy she wants to use.  If has to mailed then this can take up to a week to get.

## 2018-03-03 ENCOUNTER — HEALTH MAINTENANCE LETTER (OUTPATIENT)
Age: 58
End: 2018-03-03

## 2018-04-03 LAB
CREAT SERPL-MCNC: 0.98 MG/DL (ref 0.57–1.11)
GFR SERPL CREATININE-BSD FRML MDRD: 58 ML/MIN/1.73M2
TSH SERPL-ACNC: 2.58 UIU/ML (ref 0.35–4.94)

## 2018-05-07 LAB — PHQ9 SCORE: 12

## 2018-07-10 ENCOUNTER — HOSPITAL ENCOUNTER (EMERGENCY)
Facility: CLINIC | Age: 58
Discharge: HOME | End: 2018-07-11
Attending: PHYSICIAN ASSISTANT | Admitting: PHYSICIAN ASSISTANT
Payer: COMMERCIAL

## 2018-07-10 ENCOUNTER — APPOINTMENT (OUTPATIENT)
Dept: ULTRASOUND IMAGING | Facility: CLINIC | Age: 58
End: 2018-07-10
Attending: PHYSICIAN ASSISTANT
Payer: COMMERCIAL

## 2018-07-10 DIAGNOSIS — R10.2 PELVIC CRAMPING: ICD-10-CM

## 2018-07-10 LAB
ALBUMIN UR-MCNC: NEGATIVE MG/DL
ANION GAP SERPL CALCULATED.3IONS-SCNC: 8 MMOL/L (ref 3–14)
APPEARANCE UR: CLEAR
BASOPHILS # BLD AUTO: 0 10E9/L (ref 0–0.2)
BASOPHILS NFR BLD AUTO: 0.6 %
BILIRUB UR QL STRIP: NEGATIVE
BUN SERPL-MCNC: 18 MG/DL (ref 7–30)
CALCIUM SERPL-MCNC: 8.6 MG/DL (ref 8.5–10.1)
CHLORIDE SERPL-SCNC: 102 MMOL/L (ref 94–109)
CO2 SERPL-SCNC: 27 MMOL/L (ref 20–32)
COLOR UR AUTO: NORMAL
CREAT SERPL-MCNC: 0.91 MG/DL (ref 0.52–1.04)
DIFFERENTIAL METHOD BLD: ABNORMAL
EOSINOPHIL NFR BLD AUTO: 3.6 %
ERYTHROCYTE [DISTWIDTH] IN BLOOD BY AUTOMATED COUNT: 14.3 % (ref 10–15)
GFR SERPL CREATININE-BSD FRML MDRD: 63 ML/MIN/1.7M2
GLUCOSE SERPL-MCNC: 91 MG/DL (ref 70–99)
GLUCOSE UR STRIP-MCNC: NEGATIVE MG/DL
HCT VFR BLD AUTO: 44 % (ref 35–47)
HGB BLD-MCNC: 14.7 G/DL (ref 11.7–15.7)
HGB UR QL STRIP: NEGATIVE
IMM GRANULOCYTES # BLD: 0 10E9/L (ref 0–0.4)
IMM GRANULOCYTES NFR BLD: 0.2 %
KETONES UR STRIP-MCNC: NEGATIVE MG/DL
LEUKOCYTE ESTERASE UR QL STRIP: NEGATIVE
LYMPHOCYTES # BLD AUTO: 0.9 10E9/L (ref 0.8–5.3)
LYMPHOCYTES NFR BLD AUTO: 19.8 %
MCH RBC QN AUTO: 29.3 PG (ref 26.5–33)
MCHC RBC AUTO-ENTMCNC: 33.4 G/DL (ref 31.5–36.5)
MCV RBC AUTO: 88 FL (ref 78–100)
MONOCYTES # BLD AUTO: 0.8 10E9/L (ref 0–1.3)
MONOCYTES NFR BLD AUTO: 16.2 %
NEUTROPHILS # BLD AUTO: 2.8 10E9/L (ref 1.6–8.3)
NEUTROPHILS NFR BLD AUTO: 59.6 %
NITRATE UR QL: NEGATIVE
NRBC # BLD AUTO: 0 10*3/UL
NRBC BLD AUTO-RTO: 0 /100
PH UR STRIP: 5 PH (ref 5–7)
PLATELET # BLD AUTO: 111 10E9/L (ref 150–450)
POTASSIUM SERPL-SCNC: 3.9 MMOL/L (ref 3.4–5.3)
RBC # BLD AUTO: 5.01 10E12/L (ref 3.8–5.2)
RBC #/AREA URNS AUTO: <1 /HPF (ref 0–2)
SODIUM SERPL-SCNC: 137 MMOL/L (ref 133–144)
SOURCE: NORMAL
SP GR UR STRIP: 1 (ref 1–1.03)
SQUAMOUS #/AREA URNS AUTO: 1 /HPF (ref 0–1)
UROBILINOGEN UR STRIP-MCNC: 0 MG/DL (ref 0–2)
WBC # BLD AUTO: 4.8 10E9/L (ref 4–11)
WBC #/AREA URNS AUTO: <1 /HPF (ref 0–5)

## 2018-07-10 PROCEDURE — 87210 SMEAR WET MOUNT SALINE/INK: CPT | Performed by: PHYSICIAN ASSISTANT

## 2018-07-10 PROCEDURE — 25000128 H RX IP 250 OP 636: Performed by: PHYSICIAN ASSISTANT

## 2018-07-10 PROCEDURE — 85025 COMPLETE CBC W/AUTO DIFF WBC: CPT | Performed by: PHYSICIAN ASSISTANT

## 2018-07-10 PROCEDURE — 81001 URINALYSIS AUTO W/SCOPE: CPT | Performed by: PHYSICIAN ASSISTANT

## 2018-07-10 PROCEDURE — 99284 EMERGENCY DEPT VISIT MOD MDM: CPT | Mod: Z6 | Performed by: PHYSICIAN ASSISTANT

## 2018-07-10 PROCEDURE — 99284 EMERGENCY DEPT VISIT MOD MDM: CPT | Mod: 25 | Performed by: PHYSICIAN ASSISTANT

## 2018-07-10 PROCEDURE — 96374 THER/PROPH/DIAG INJ IV PUSH: CPT | Performed by: PHYSICIAN ASSISTANT

## 2018-07-10 PROCEDURE — 86304 IMMUNOASSAY TUMOR CA 125: CPT | Performed by: PHYSICIAN ASSISTANT

## 2018-07-10 PROCEDURE — 76830 TRANSVAGINAL US NON-OB: CPT

## 2018-07-10 PROCEDURE — 80048 BASIC METABOLIC PNL TOTAL CA: CPT | Performed by: PHYSICIAN ASSISTANT

## 2018-07-10 RX ORDER — BUSPIRONE HYDROCHLORIDE 15 MG/1
15 TABLET ORAL
COMMUNITY
Start: 2018-05-07 | End: 2019-02-22

## 2018-07-10 RX ORDER — KETOROLAC TROMETHAMINE 30 MG/ML
30 INJECTION, SOLUTION INTRAMUSCULAR; INTRAVENOUS ONCE
Status: COMPLETED | OUTPATIENT
Start: 2018-07-10 | End: 2018-07-10

## 2018-07-10 RX ADMIN — KETOROLAC TROMETHAMINE 30 MG: 30 INJECTION, SOLUTION INTRAMUSCULAR at 23:33

## 2018-07-10 NOTE — ED AVS SNAPSHOT
[facility] Emergency Department    [address] [place] [address]    Phone:  [phone]    Fax:  [phone]                                       Amisha Lerma   MRN: [id number]    Department:  [facility] Emergency Department   Date of Visit:  7/10/2018           After Visit Summary Signature Page     I have received my discharge instructions, and my questions have been answered. I have discussed any challenges I see with this plan with the nurse or doctor.    ..........................................................................................................................................  Patient/Patient Representative Signature      ..........................................................................................................................................  Patient Representative Print Name and Relationship to Patient    ..................................................               ................................................  Date                                            Time    ..........................................................................................................................................  Reviewed by Signature/Title    ...................................................              ..............................................  Date                                                            Time

## 2018-07-10 NOTE — ED AVS SNAPSHOT
[facility] Emergency Department    [address] [place] [address]    Phone:  [phone]    Fax:  [phone]                                       Amisha Lerma   MRN: [id number]    Department:  [facility] Emergency Department   Date of Visit:  7/10/2018           Patient Information     Date Of Birth          1960        Your diagnoses for this visit were:     Pelvic cramping        You were seen by Dafne Parra PA-C.      Follow-up Information     Call Marin Dickinson MD.    Specialties:  Family Practice, OB/Gyn    Why:  For ER follow up    Contact information:    [address] [place] [address] [phone]          Follow up with [facility] Emergency Department.    Specialty:  EMERGENCY MEDICINE    Why:  If symptoms worsen    Contact information:    [address] [place] [address] [phone]    Additional information:    From [address]: Exit at [address] on [place] side of [place]. Turn right on [address]. Turn left at stoplight on [address]. [facility] will be in view two blocks ahead      Discharge References/Attachments     PELVIC PAIN, UNKNOWN CAUSE (ENGLISH)      24 Hour Appointment Hotline       To make an appointment at any [facility] clinic, call [phone] ([phone]). If you don't have a family doctor or clinic, we will help you find one. [place] [facility] clinics are conveniently located to serve the needs of you and your family.             Review of your medicines      START taking        Dose / Directions Last dose taken    ketorolac 10 MG tablet   Commonly known as:  TORADOL   Dose:  10 mg   Quantity:  10 tablet        Take 1 tablet (10 mg) by mouth every 6 hours as needed for moderate pain   Refills:  0          Our records show that you are taking the medicines listed below. If these are incorrect, please call your family doctor or clinic.        Dose / Directions Last dose  taken    busPIRone 15 MG tablet   Commonly known as:  BUSPAR   Dose:  15 mg        Take 15 mg by mouth   Refills:  0        dicyclomine 20 MG tablet   Commonly known as:  BENTYL   Dose:  20 mg   Quantity:  20 tablet        Take 1 tablet (20 mg) by mouth 4 times daily as needed   Refills:  1        HYDROcodone-acetaminophen 5-325 MG per tablet   Commonly known as:  NORCO   Dose:  1-2 tablet   Quantity:  20 tablet        Take 1-2 tablets by mouth every 4 hours as needed for moderate to severe pain   Refills:  0        LEXAPRO 20 MG tablet   Dose:  20 mg   Generic drug:  escitalopram        Take 20 mg by mouth daily.   Refills:  0        LORazepam 1 MG tablet   Commonly known as:  ATIVAN   Dose:  1 mg        Take 1 mg by mouth 2 times daily as needed.   Refills:  0        OMEPRAZOLE PO   Dose:  20 mg        Take 20 mg by mouth every other day   Refills:  0        oxyCODONE IR 5 MG tablet   Commonly known as:  ROXICODONE   Dose:  5 mg   Quantity:  10 tablet        Take 1 tablet (5 mg) by mouth every 6 hours as needed for pain   Refills:  0        SYNTHROID 125 MCG tablet   Dose:  125 mcg   Generic drug:  levothyroxine        Take 125 mcg by mouth daily.   Refills:  0          STOP taking        Dose Reason for stopping Comments    ibuprofen 800 MG tablet   Commonly known as:  ADVIL/MOTRIN                      Prescriptions were sent or printed at these locations (1 Prescription)                   [facility] - [address] [address]    Telephone:  [phone]   Fax:  [phone]   Hours:                  E-Prescribed (1 of 1)         ketorolac (TORADOL) 10 MG tablet                Procedures and tests performed during your visit     Basic metabolic panel        CBC with platelets differential    UA with Microscopic    US Pelvis Cmplt w Transvag & Doppler LmtPel Duplex Limited    Wet prep      Orders Needing Specimen Collection     None       Pending Results     Date and Time Order Name Status Description    7/10/2018 2134  In process             Pending Culture Results     No orders found for last 3 day(s).            Pending Results Instructions     If you had any lab results that were not finalized at the time of your Discharge, you can call the ED Lab Result RN at [phone]. You will be contacted by this team for any positive Lab results or changes in treatment. The nurses are available 7 days a week from 10A to 6:30P.  You can leave a message 24 hours per day and they will return your call.        Thank you for choosing [facility]       Thank you for choosing [facility] for your care. Our goal is always to provide you with excellent care. Hearing back from our patients is one way we can continue to improve our services. Please take a few minutes to complete the written survey that you may receive in the mail after you visit with us. Thank you!        [employer] Information     [employer] gives you secure access to your electronic health record. If you see a primary care provider, you can also send messages to your care team and make appointments. If you have questions, please call your primary care clinic.  If you do not have a primary care provider, please call [phone] and they will assist you.        Care EveryWhere ID     This is your Care EveryWhere ID. This could be used by other organizations to access your [facility] medical records  [id number]        Equal Access to Services     NUNO AJ : Hadii fe Tran, wajackelineda luradhaadaha, qaybta kaalponce pollack, elaine gonzalez. So [facility] [phone].    ATENCIÓN: Si habla español, tiene a perez disposición servicios gratuitos de asistencia lingüística. Llame al [phone].    We comply with applicable federal civil rights laws and [place] laws. We do not discriminate on the basis of race, color, national origin, age, disability, sex, sexual orientation,  or gender identity.            After Visit Summary       This is your record. Keep this with you and show to your community pharmacist(s) and doctor(s) at your next visit.

## 2018-07-11 VITALS
HEART RATE: 77 BPM | RESPIRATION RATE: 20 BRPM | OXYGEN SATURATION: 97 % | SYSTOLIC BLOOD PRESSURE: 112 MMHG | BODY MASS INDEX: 30.41 KG/M2 | WEIGHT: 200 LBS | DIASTOLIC BLOOD PRESSURE: 69 MMHG | TEMPERATURE: 98.8 F

## 2018-07-11 LAB
CANCER AG125 SERPL-ACNC: 12 U/ML (ref 0–30)
SPECIMEN SOURCE: NORMAL
WET PREP SPEC: NORMAL

## 2018-07-11 RX ORDER — MELOXICAM 15 MG/1
15 TABLET ORAL DAILY
Qty: 15 TABLET | Refills: 0 | Status: SHIPPED | OUTPATIENT
Start: 2018-07-11 | End: 2018-07-11

## 2018-07-11 RX ORDER — KETOROLAC TROMETHAMINE 10 MG/1
10 TABLET, FILM COATED ORAL EVERY 6 HOURS PRN
Qty: 10 TABLET | Refills: 0 | Status: SHIPPED | OUTPATIENT
Start: 2018-07-11 | End: 2018-07-19

## 2018-07-11 RX ORDER — KETOROLAC TROMETHAMINE 10 MG/1
10 TABLET, FILM COATED ORAL EVERY 6 HOURS PRN
Qty: 10 TABLET | Refills: 0 | Status: SHIPPED | OUTPATIENT
Start: 2018-07-11 | End: 2018-07-11

## 2018-07-11 NOTE — ED PROVIDER NOTES
"  History     Chief Complaint   Patient presents with     Abdominal Pain     The history is provided by the patient.     Amisha Lerma is a 57 year old female who presents to the emergency department for abdominal pain. Patient reports that she has been experiencing pelvic cramping for the last two 2 months. She states she has cramping/pain everyday and it is getting worse. She states she has nausea, bloating, fatigue, weight loss and chills.  She states in the last month she has lost about 20 pounds.  She denies any vaginal bleeding or discharge.  Denies vomiting or fevers.  No flank pain or urinary symptoms.  Patient reports she had a uterine ablation \"quite a few years ago\" and her LMP was about 15 years ago. Patient reports she is overdue for a pap/GYN exam. She states in the past she has had precancerous cells on her pap smears. She has not been seen for \"a while\". She states \"I live in [place], my doctor is in [facility] and I am in the process of changing providers to [facility]\". \"I don't trust [facility] anymore\". \"I just thought my symptoms would go away\". She has tried Ibuprofen and Acetaminophen and it does help with the pain. Patient is a smoker.    Problem List:    Patient Active Problem List    Diagnosis Date Noted     Acute appendicitis with peritoneal abscess 06/15/2012     Priority: High     Fever and chills 06/15/2012     Priority: High     Acute bronchitis 02/23/2016     Priority: Medium     Atypical chest pain 02/23/2016     Priority: Medium     Cholelithiasis 10/01/2013     Priority: Medium     Splenomegaly 10/01/2013     Priority: Medium     CARDIOVASCULAR SCREENING; LDL GOAL LESS THAN 160 07/26/2012     Priority: Medium     Family history of ischemic heart disease 07/17/2012     Priority: Medium     Family history of colon cancer 07/17/2012     Priority: Medium     Family history of skin cancer 07/17/2012     Priority: Medium     Family history of high cholesterol 07/17/2012     Priority: Medium "     Abdominal abscess (H) 07/09/2012     Priority: Medium     (Problem list name updated by automated process. Provider to review and confirm.)       Acute appendicitis with generalized peritonitis 07/09/2012     Priority: Medium     Anxiety 06/15/2012     Priority: Medium     Hypothyroidism 06/15/2012     Priority: Low     Major depression in complete remission (H) 06/15/2012     Priority: Low     Tobacco abuse 06/15/2012     Priority: Low        Past Medical History:    Past Medical History:   Diagnosis Date     Anxiety      Cholelithiasis 10/1/2013     Depressive disorder      Dysmenorrhea      Menometrorrhagia      Splenomegaly 10/1/2013     Substance abuse      Thyroid disease        Past Surgical History:    Past Surgical History:   Procedure Laterality Date     ABDOMEN SURGERY       GYN SURGERY      ablation     LAPAROSCOPIC APPENDECTOMY  7/19/2012    Procedure: LAPAROSCOPIC APPENDECTOMY;  laparoscopic appendectomy, abscess drainage, lysis of adhesions;  Surgeon: Jeffrey Adair MD;  Location: [facility]     LAPAROSCOPIC LYSIS ADHESIONS  7/19/2012    Procedure: LAPAROSCOPIC LYSIS ADHESIONS;;  Surgeon: Jeffrey Adair MD;  Location: [facility] OR     ORTHOPEDIC SURGERY         Family History:    Family History   Problem Relation Age of Onset     Cancer Brother      lung and brain       Social History:  Marital Status:   [2]  Social History   Substance Use Topics     Smoking status: Current Every Day Smoker     Packs/day: 1.00     Years: 30.00     Smokeless tobacco: Never Used     Alcohol use No      Comment: Alcohol free for 19 years        Medications:      busPIRone (BUSPAR) 15 MG tablet   escitalopram (LEXAPRO) 20 MG tablet   ketorolac (TORADOL) 10 MG tablet   levothyroxine (SYNTHROID) 125 MCG tablet   lorazepam (ATIVAN) 1 MG tablet   OMEPRAZOLE PO   dicyclomine (BENTYL) 20 MG tablet   HYDROcodone-acetaminophen (NORCO) 5-325 MG per tablet   oxyCODONE IR (ROXICODONE) 5 MG tablet         Review of  Systems   All other systems reviewed and are negative.      Physical Exam   BP: 126/75  Pulse: 77  Heart Rate: 68  Temp: 97.6  F (36.4  C)  Resp: 18  Weight: 90.7 kg (200 lb)  SpO2: 99 %      Physical Exam   Constitutional: She is oriented to person, place, and time. She appears well-developed and well-nourished. No distress.   HENT:   Head: Normocephalic and atraumatic.   Eyes: Conjunctivae are normal.   Neck: Normal range of motion.   Cardiovascular: Normal rate, regular rhythm and normal heart sounds.    Pulmonary/Chest: Effort normal and breath sounds normal.   Abdominal: Soft. She exhibits no distension. There is tenderness (mild) in the suprapubic area. There is no rebound, no guarding and no CVA tenderness.   Genitourinary: Vagina normal. Uterus is tender (very mild). Cervix exhibits no motion tenderness, no discharge and no friability. Right adnexum displays no mass and no tenderness. Left adnexum displays no mass and no tenderness. No bleeding in the vagina.       Genitourinary Comments: A few areas of scarring vs lesion on cervix to area noted. Nontender cervix.   Neurological: She is alert and oriented to person, place, and time.   Skin: Skin is warm and dry. She is not diaphoretic.   Psychiatric: She has a normal mood and affect.   Nursing note and vitals reviewed.      ED Course     ED Course     Procedures    Results for orders placed or performed during the hospital encounter of 07/10/18 (from the past 24 hour(s))   UA with Microscopic   Result Value Ref Range    Color Urine Straw     Appearance Urine Clear     Glucose Urine Negative NEG^Negative mg/dL    Bilirubin Urine Negative NEG^Negative    Ketones Urine Negative NEG^Negative mg/dL    Specific Gravity Urine 1.004 1.003 - 1.035    Blood Urine Negative NEG^Negative    pH Urine 5.0 5.0 - 7.0 pH    Protein Albumin Urine Negative NEG^Negative mg/dL    Urobilinogen mg/dL 0.0 0.0 - 2.0 mg/dL    Nitrite Urine Negative NEG^Negative    Leukocyte Esterase  Urine Negative NEG^Negative    Source Unspecified Urine     WBC Urine <1 0 - 5 /HPF    RBC Urine <1 0 - 2 /HPF    Squamous Epithelial /HPF Urine 1 0 - 1 /HPF   US Pelvis Cmplt w Transvag & Doppler LmtPel Duplex Limited    Narrative    ULTRASOUND PELVIS WITH TRANSVAGINAL IMAGING AND DOPPLER   7/10/2018  10:32 PM     HISTORY: Pelvic pain.    COMPARISON: 12/23/2017 - CT abdomen and pelvis.    TECHNIQUE: Endovaginal imaging was also performed to better evaluate  the ovaries. Spectral waveform and color Doppler evaluation were  performed of the ovaries.    FINDINGS: The uterus is retroflexed, measuring 4.3 x 3.2 x 4.3 cm in  long axis, short axis and transverse dimensions respectively. No  myometrial masses. The endometrial stripe measures 0.6 cm in  thickness. Several nabothian cysts within the uterine cervix. The  right and left ovaries are unremarkable, measuring 3.8 x 1.4 x 1.2 cm  and 2.9 x 1.5 x 1.4 cm respectively. Blood flow is visualized within  both ovaries. No adnexal masses. No free fluid in the pelvis.      Impression    IMPRESSION:   1. Unremarkable appearance of the uterus and ovaries. Blood flow is  visualized in both ovaries.  2. No free fluid in the pelvis.    LYUDMILA SHEIKH MD   CBC with platelets differential   Result Value Ref Range    WBC 4.8 4.0 - 11.0 10e9/L    RBC Count 5.01 3.8 - 5.2 10e12/L    Hemoglobin 14.7 11.7 - 15.7 g/dL    Hematocrit 44.0 35.0 - 47.0 %    MCV 88 78 - 100 fl    MCH 29.3 26.5 - 33.0 pg    MCHC 33.4 31.5 - 36.5 g/dL    RDW 14.3 10.0 - 15.0 %    Platelet Count 111 (L) 150 - 450 10e9/L    Diff Method Automated Method     % Neutrophils 59.6 %    % Lymphocytes 19.8 %    % Monocytes 16.2 %    % Eosinophils 3.6 %    % Basophils 0.6 %    % Immature Granulocytes 0.2 %    Nucleated RBCs 0 0 /100    Absolute Neutrophil 2.8 1.6 - 8.3 10e9/L    Absolute Lymphocytes 0.9 0.8 - 5.3 10e9/L    Absolute Monocytes 0.8 0.0 - 1.3 10e9/L    Absolute Basophils 0.0 0.0 - 0.2 10e9/L    Abs Immature  Granulocytes 0.0 0 - 0.4 10e9/L    Absolute Nucleated RBC 0.0    Basic metabolic panel   Result Value Ref Range    Sodium 137 133 - 144 mmol/L    Potassium 3.9 3.4 - 5.3 mmol/L    Chloride 102 94 - 109 mmol/L    Carbon Dioxide 27 20 - 32 mmol/L    Anion Gap 8 3 - 14 mmol/L    Glucose 91 70 - 99 mg/dL    Urea Nitrogen 18 7 - 30 mg/dL    Creatinine 0.91 0.52 - 1.04 mg/dL    GFR Estimate 63 >60 mL/min/1.7m2    GFR Estimate If Black 77 >60 mL/min/1.7m2    Calcium 8.6 8.5 - 10.1 mg/dL      Result Value Ref Range     12 0 - 30 U/mL   Wet prep   Result Value Ref Range    Specimen Description Vagina     Wet Prep No Trichomonas seen     Wet Prep No clue cells seen     Wet Prep No yeast seen     Wet Prep Few  WBC'S seen       Wet Prep Scant material seen on sample submitted        Medications   ketorolac (TORADOL) injection 30 mg (30 mg Intravenous Given 7/10/18 8308)       Assessments & Plan (with Medical Decision Making)  Amisha Lerma is a 57 year old female who presented the ED complaining of pelvic cramping for the last few months.  She states that she feels bloated, intermittently nauseous, and reports a weight loss of 20 pounds.  Denies any other symptoms.  She was afebrile on arrival with normal vital signs.  Weight today was 200 pounds, and per care everywhere her weight on June 5 was 203 pounds, and in May was she was 206 pounds, so it does not look like she has lost as much weight as she thinks. She had mild suprapubic tenderness on exam today. Pelvic exam without acute abnormalities- did have mild uterine tenderness but otherwise benign.  He was concern for intrauterine or possible ovarian etiology for her ongoing cramping symptoms.  IV access obtained and labs drawn.  She was given Toradol here for pain with good relief.  White count and hemoglobin normal today, BMP normal as well.   ordered and pending given her symptoms of bloating, pelvic pain, and weight loss as these are concerning for  potential malignancy.  Pelvic ultrasound showed a few nabothian cysts within the uterine cervix but otherwise benign.  Urine without evidence of infection or blood.  Wet prep negative.  These results were discussed with the patient and she was very reassured.  Unfortunately I am not finding a clear cause for her ongoing pelvic cramping based on chronicity and reassuring workup today I think this can be further evaluated in the outpatient setting and patient agrees.  She was given contact info of gynecology team here to call for scheduling follow-up.  I discussed pain management options with the patient.  She reports ibuprofen is only minimally helping.  I suggested different type of NSAID and initially she was agreeable to meloxicam but at time of discharge she grew frustrated and preferred to take something that she already is familiar with.  After further discussion she was prescribed oral Toradol and I did go over side effects and risks associated with this medication.  She was advised to stop taking ibuprofen when taking this but she can continue using Tylenol as needed.  She was provided instructions on when to return to the ED.  All questions were answered and patient was discharged home in suitable condition     I have reviewed the nursing notes.    I have reviewed the findings, diagnosis, plan and need for follow up with the patient.    Discharge Medication List as of 7/11/2018 12:59 AM      START taking these medications    Details   ketorolac (TORADOL) 10 MG tablet Take 1 tablet (10 mg) by mouth every 6 hours as needed for moderate pain, Disp-10 tablet, R-0, E-Prescribe             Final diagnoses:   Pelvic cramping     This document serves as a record of services personally performed by Dafne Parra PA. It was created on their behalf by Chela Lawler, a trained medical scribe. The creation of this record is based on the provider's personal observations and the statements of the patient. This document has  been checked and approved by the attending provider.    Note: Chart documentation done in part with Dragon Voice Recognition software. Although reviewed after completion, some word and grammatical errors may remain.    7/10/2018   [facility] EMERGENCY DEPARTMENT     Dafne Parra PA-C  07/11/18 6918

## 2018-07-11 NOTE — ED NOTES
"Writer was going over discharge instructions with pt and reviewing new medication. Pt began crying, states she is upset and feels her pain is being ignored. States \"I don't understand why I am getting new medication that I don't understand, I want what works for me. Look in my chart and see what I've taken for pain in the past.\"  "

## 2018-07-11 NOTE — ED TRIAGE NOTES
"Pt c/o lower abd pain x 2-3 months.  Pain is getting worse and she \"just couldn't take it anymore\".  Pt denies hematuria, dysuria, frequency.  Has nausea but no vomiting or diarrhea.   "

## 2018-07-11 NOTE — ED NOTES
Pt reports pelvic pain that she describes as cramping for the past couple months. Pt denies any vaginal discharge or bleeding.

## 2018-07-11 NOTE — ED NOTES
Pt reports she has been trying multiple things for pain, states ice and heat have not been helpful and ibuprofen only take the edge off. States about 10am today she took 6, 200mg tabs of ibuprofen.

## 2018-07-19 ENCOUNTER — OFFICE VISIT (OUTPATIENT)
Dept: FAMILY MEDICINE | Facility: CLINIC | Age: 58
End: 2018-07-19
Payer: COMMERCIAL

## 2018-07-19 ENCOUNTER — DOCUMENTATION ONLY (OUTPATIENT)
Dept: FAMILY MEDICINE | Facility: CLINIC | Age: 58
End: 2018-07-19

## 2018-07-19 VITALS
DIASTOLIC BLOOD PRESSURE: 78 MMHG | BODY MASS INDEX: 30.14 KG/M2 | OXYGEN SATURATION: 97 % | HEART RATE: 85 BPM | SYSTOLIC BLOOD PRESSURE: 108 MMHG | WEIGHT: 198.2 LBS | TEMPERATURE: 97 F

## 2018-07-19 DIAGNOSIS — R10.2 PELVIC PAIN IN FEMALE: Primary | ICD-10-CM

## 2018-07-19 DIAGNOSIS — Z87.42 HX OF ABNORMAL CERVICAL PAP SMEAR: ICD-10-CM

## 2018-07-19 LAB
ALBUMIN UR-MCNC: NEGATIVE MG/DL
APPEARANCE UR: CLEAR
BILIRUB UR QL STRIP: NEGATIVE
COLOR UR AUTO: YELLOW
GLUCOSE UR STRIP-MCNC: NEGATIVE MG/DL
HGB UR QL STRIP: NEGATIVE
KETONES UR STRIP-MCNC: NEGATIVE MG/DL
LEUKOCYTE ESTERASE UR QL STRIP: NEGATIVE
NITRATE UR QL: NEGATIVE
PH UR STRIP: 5 PH (ref 5–7)
SOURCE: ABNORMAL
SP GR UR STRIP: 1.01 (ref 1–1.03)
UROBILINOGEN UR STRIP-MCNC: 4 MG/DL (ref 0–2)

## 2018-07-19 PROCEDURE — 81003 URINALYSIS AUTO W/O SCOPE: CPT | Performed by: FAMILY MEDICINE

## 2018-07-19 PROCEDURE — 99214 OFFICE O/P EST MOD 30 MIN: CPT | Performed by: FAMILY MEDICINE

## 2018-07-19 ASSESSMENT — PAIN SCALES - GENERAL: PAINLEVEL: EXTREME PAIN (8)

## 2018-07-19 NOTE — PROGRESS NOTES
SUBJECTIVE:   Amisha Lerma is a 57 year old female who presents to clinic today for the following health issues:      URINARY TRACT SYMPTOMS  Onset: 3 weeks     Description:   Painful urination (Dysuria): YES  Blood in urine (Hematuria): YES  Delay in urine (Hesitency): YES    Intensity: severe    Progression of Symptoms:  worsening    Accompanying Signs & Symptoms:  Fever/chills: YES  Flank pain YES  Nausea and vomiting: YES- nausea  Any vaginal symptoms: none  Abdominal/Pelvic Pain: YES    History:   History of frequent UTI's: no   History of kidney stones: no   Sexually Active: no   Possibility of pregnancy: No    Precipitating factors:   n/a    Therapies Tried and outcome: Increase fluid intake    Amisha is here today for possible bladder infection.  She was seen in the ER on 7/10/18 for lower abdominal pain that she has had for about 2 months and was getting worse.  Also was feeling nausea, bloating with chills.  Stated that she was losing some weight.  Worked up in the ER  Recently showed normal UA with normal and pelvic ultrasound. CBC and CMP was also normal.   level was normal with negative wet prep.  Recommended to follow up with her primary care provider for further evaluation if the symptoms persist or get worse    Stated that since discharged from the ER, still having the lower abdominal pain which is overall about the same.  New symptoms include urinary urgency and frequency without dysuria.  Has has the chill, but no fever.  No N/V/D/C.  No obvious hematuria nor history of kidney stones.  Has the chronic lower back pain which is at the baseline.    Stated that she has similar symptoms on and off for several years.  No known triggering factor.  Stated that she saw an OB/GYN several years ago and work up for uterine prolapse was negative.  Has not follow up with her GYN since then.  History of abdominal surgery include appendectomy, uterine ablation and abdominal surgery for lysis adhesion.   "Stated that one GYN recommended hysterectomy while the other recommended conservative management. Her primary care provider is Dr. Gardner in [place], [place]. Her primary GYN are Dr. Lopez and  in [place].           Problem list and histories reviewed & adjusted, as indicated.  Additional history: as documented    Current Outpatient Prescriptions   Medication Sig Dispense Refill     busPIRone (BUSPAR) 15 MG tablet Take 15 mg by mouth       escitalopram (LEXAPRO) 20 MG tablet Take 20 mg by mouth daily.       levothyroxine (SYNTHROID) 125 MCG tablet Take 125 mcg by mouth daily.       lorazepam (ATIVAN) 1 MG tablet Take 1 mg by mouth 2 times daily as needed.       OMEPRAZOLE PO Take 20 mg by mouth every other day       Allergies   Allergen Reactions     Dilaudid [Hydromorphone Hcl]      Makes her \"pukey\" after surgery     Fentanyl      Other reaction(s): Agitation, Intolerance-Can't Take     Nickel        Reviewed and updated as needed this visit by clinical staff  Tobacco  Allergies  Meds       Reviewed and updated as needed this visit by Provider         ROS:  Constitutional, HEENT, cardiovascular, pulmonary, gi and gu systems are negative, except as otherwise noted.    OBJECTIVE:     /78  Pulse 85  Temp 97  F (36.1  C) (Temporal)  Wt 198 lb 3.2 oz (89.9 kg)  SpO2 97%  BMI 30.14 kg/m2  Body mass index is 30.14 kg/(m^2).  GENERAL: healthy, alert and no distress  RESP: lungs clear to auscultation - no rales, rhonchi or wheezes  CV: regular rate and rhythm, no murmur  ABDOMEN: soft, nondistended, no palpable masses or organomegaly with normal bowel sounds.  She is mildly obese.  No CVA tenderness.  Tender with palpation to the lower abdomen, worse in the right groin area with guarding but no rebound.  No tender with palpation to upper right quadrant or epigastric area.  MS: no gross musculoskeletal defects noted, no edema.  Hips and lower back exam was normal.  No tender with palpation to lumbar " sacral spine or its paraspinous muscle.    Diagnostic Test Results:  Results for orders placed or performed in visit on 07/19/18   *UA reflex to Microscopic and Culture ([place]; [facility]; [facility]; [facility]; [facility]; [facility]; [place]; [place])   Result Value Ref Range    Color Urine Yellow     Appearance Urine Clear     Glucose Urine Negative NEG^Negative mg/dL    Bilirubin Urine Negative NEG^Negative    Ketones Urine Negative NEG^Negative mg/dL    Specific Gravity Urine 1.015 1.003 - 1.035    Blood Urine Negative NEG^Negative    pH Urine 5.0 5.0 - 7.0 pH    Protein Albumin Urine Negative NEG^Negative mg/dL    Urobilinogen mg/dL 4.0 (H) 0.0 - 2.0 mg/dL    Nitrite Urine Negative NEG^Negative    Leukocyte Esterase Urine Negative NEG^Negative    Source Unspecified Urine        ASSESSMENT/PLAN:       ICD-10-CM    1. Pelvic pain in female R10.2 CBC with platelets and differential     Comprehensive metabolic panel (BMP + Alb, Alk Phos, ALT, AST, Total. Bili, TP)     Erythrocyte sedimentation rate auto     CRP inflammation     CT Abdomen Pelvis w Contrast     CANCELED: CBC with platelets and differential     CANCELED: Comprehensive metabolic panel (BMP + Alb, Alk Phos, ALT, AST, Total. Bili, TP)     CANCELED: Erythrocyte sedimentation rate auto     CANCELED: CRP inflammation     CANCELED: CT Abdomen Pelvis w/o & w Contrast   2. Hx of abnormal cervical Pap smear Z87.898      Amisha presented with couple month history of low was abdominal pain.  She was in the ER recently and workup thre was negative include normal CBC, CMP and  level.  The Wet prep was also negative.  She has had similar pain that comes and goes for several years.  Was seen by a GYN and per her report, she was recommended for hysterectomy by one of the GYN specialists.  Has not followed with the GYN since then, several years ago.  Been having the urinary frequency and and urgency without dysuria in the last couple  days.  Physical exam today is significant for focal tenderness with palpation to the right groin area.  She is status post appendectomy.  Her vital signs today are stable and her physical exam today otherwise was negative/normal.    I reviewed the medical record, her 2013, 2015 and 2017 CT scans of the abdomen/pelvis showed cholelithiasis, no other obvious abnormality.  2016 upper right quadrant ultrasound showed cholelithiasis without evidence of acute cholecystitis.  There was a large gallstone is seen to the gallbladder neck was large in the gallbladder.  2013 CT scan also showed splenomegaly.  She has a history of EBV infection.    Informed her that her urine test today again did not suggest of UTI.  She has been having the symptoms for 2 motnhs and it is getting worse.  It is also a recurrent problem for her.  I recommended her to follow up with her primary care provider as soon as possible for further evaluations.  She also will need to follow with her OB/GYN.  She denied of STD risk. She over due for her pap and she has a history of abnormal pap smear.  Pelvic exam was done in the ER couple days; did not want to have another one done today.  Labs ordered today include CMP, sed rate, CRP and CBC.  She left the clinic without getting the lab work drawn - she was recommended to return for lab drawn.  She is quite concerned and I am not really sure what the cause of her pain is especially if he has it for 2 months getting worse.  CT scan was ordered today.    She was also recommended to drink a lot of water.  Advance her normal activities as tolerated.  Symptoms the need to go to the emergency room discussed, including develop fever, worsening of pain, vaginal bleeding or if she has any concerns.  Again, emphasized on the importance of her to follow-up with her primary care provider for further workup if the symptom persists or get worse as well.  She felt comfortable with the plan and all of her questions were  answered.      Tangela Esparza Mai, MD  [facility]

## 2018-07-19 NOTE — MR AVS SNAPSHOT
After Visit Summary   7/19/2018    Amisha Lerma    MRN: [id number]           Patient Information     Date Of Birth          1960        Visit Information        Provider Department      7/19/2018 2:00 PM Tangela Boswell MD [facility]        Today's Diagnoses     Pelvic pain in female    -  1       Follow-ups after your visit        Your next 10 appointments already scheduled     Jul 20, 2018  8:30 AM CDT   CT ABDOMEN PELVIS W/O & W CONTRAST with PHCT1   [facility] CT Scan ([facility])    [address] [place] [id number] [phone]           Please bring any scans or X-rays taken at other hospitals, if similar tests were done. Also bring a list of your medicines, including vitamins, minerals and over-the-counter drugs. It is safest to leave personal items at home.  Be sure to tell your doctor:   If you have any allergies.   If there s any chance you are pregnant.   If you are breastfeeding.  How to prepare:   Do not eat or drink for 2 hours before your exam. If you need to take medicine, you may take it with small sips of water. (We may ask you to take liquid medicine as well.)   Please wear loose clothing, such as a sweat suit or jogging clothes. Avoid snaps, zippers and other metal. We may ask you to undress and put on a hospital gown.  Please arrive 30 minutes early for your CT. Once in the department you might be asked to drink water 15-20 minutes prior to your exam.  If indicated you may be asked to drink an oral contrast in advance of your CT.  If this is the case, the imaging team will let you know or be in contact with you prior to your appointment  Patients over 70 or patients with diabetes or kidney problems:   If you haven t had a blood test (creatinine test) within the last 30 days, the Cardiologist/Radiologist may require you to get this test prior to your exam.  If you have diabetes:   Continue to take your metformin medication  on the day of your exam  If you have any questions, please call the Imaging Department where you will have your exam.              Future tests that were ordered for you today     Open Future Orders        Priority Expected Expires Ordered    CT Abdomen Pelvis w/o & w Contrast Routine  7/19/2019 7/19/2018            Who to contact     If you have questions or need follow up information about today's clinic visit or your schedule please contact [facility] directly at [phone].  Normal or non-critical lab and imaging results will be communicated to you by [employer], letter or phone within 4 business days after the clinic has received the results. If you do not hear from us within 7 days, please contact the clinic through [employer] or phone. If you have a critical or abnormal lab result, we will notify you by phone as soon as possible.  Submit refill requests through [employer] or call your pharmacy and they will forward the refill request to us. Please allow 3 business days for your refill to be completed.          Additional Information About Your Visit        [employer] Information     [employer] gives you secure access to your electronic health record. If you see a primary care provider, you can also send messages to your care team and make appointments. If you have questions, please call your primary care clinic.  If you do not have a primary care provider, please call [phone] and they will assist you.        Care EveryWhere ID     This is your Care EveryWhere ID. This could be used by other organizations to access your [facility] medical records  [id number]        Your Vitals Were     Pulse Temperature Pulse Oximetry BMI (Body Mass Index)          85 97  F (36.1  C) (Temporal) 97% 30.14 kg/m2         Blood Pressure from Last 3 Encounters:   07/19/18 108/78   07/11/18 112/69   12/29/17 116/76    Weight from Last 3 Encounters:   07/19/18 198 lb 3.2 oz (89.9 kg)   07/10/18 200 lb (90.7 kg)   12/29/17 216  lb 8 oz (98.2 kg)              We Performed the Following     *UA reflex to Microscopic and Culture ([place]; [facility]; [facility]; [facility]; [facility]; [facility]; [place]; [place])     CBC with platelets and differential     Comprehensive metabolic panel (BMP + Alb, Alk Phos, ALT, AST, Total. Bili, TP)     CRP inflammation     Erythrocyte sedimentation rate auto          Today's Medication Changes          These changes are accurate as of 7/19/18  3:14 PM.  If you have any questions, ask your nurse or doctor.               Stop taking these medicines if you haven't already. Please contact your care team if you have questions.     HYDROcodone-acetaminophen 5-325 MG per tablet   Commonly known as:  NORCO   Stopped by:  Tangela Boswell MD           ketorolac 10 MG tablet   Commonly known as:  TORADOL   Stopped by:  Tangela Boswell MD           oxyCODONE IR 5 MG tablet   Commonly known as:  ROXICODONE   Stopped by:  Tangela Boswell MD                    Primary Care Provider Office Phone # Fax #    Wilmer Gardner [phone]       [place] [address]        Equal Access to Services     [facility] AH: Hadii aad ku hadasho Soomaali, waaxda luqadaha, qaybta kaalmada adeegyada, elaine chandler hayirineon adejosé cuevas . So [facility] [phone].    ATENCIÓN: Si habla español, tiene a perez disposición servicios gratuitos de asistencia lingüística. Llame al [phone].    We comply with applicable federal civil rights laws and [place] laws. We do not discriminate on the basis of race, color, national origin, age, disability, sex, sexual orientation, or gender identity.            Thank you!     Thank you for choosing [facility]  for your care. Our goal is always to provide you with excellent care. Hearing back from our patients is one way we can continue to improve our services. Please take a few minutes to complete the written survey that you  may receive in the mail after your visit with us. Thank you!             Your Updated Medication List - Protect others around you: Learn how to safely use, store and throw away your medicines at www.disposemymeds.org.          This list is accurate as of 7/19/18  3:14 PM.  Always use your most recent med list.                   Brand Name Dispense Instructions for use Diagnosis    busPIRone 15 MG tablet    BUSPAR     Take 15 mg by mouth        LEXAPRO 20 MG tablet   Generic drug:  escitalopram      Take 20 mg by mouth daily.        LORazepam 1 MG tablet    ATIVAN     Take 1 mg by mouth 2 times daily as needed.        OMEPRAZOLE PO      Take 20 mg by mouth every other day        SYNTHROID 125 MCG tablet   Generic drug:  levothyroxine      Take 125 mcg by mouth daily.

## 2018-07-19 NOTE — NURSING NOTE
Health Maintenance Due   Topic Date Due     TETANUS IMMUNIZATION (SYSTEM ASSIGNED)  08/07/1978     HIV SCREEN (SYSTEM ASSIGNED)  08/07/1978     LIPID SCREEN Q5 YR FEMALE (SYSTEM ASSIGNED)  08/07/2005     MAMMO SCREEN Q2 YR (SYSTEM ASSIGNED)  08/07/2010     PHQ-9 Q6 MONTHS  04/02/2014     DEPRESSION ACTION PLAN Q1 YR  10/02/2014     ADVANCE DIRECTIVE PLANNING Q5 YRS  08/07/2015     PAP Q3 YR  01/02/2018       Health Maintenance reviewed at today's visit patient asked to schedule/complete:   Patient is aware.    Kelly Ware, CMA

## 2018-07-19 NOTE — PATIENT INSTRUCTIONS
This patient was a no show for Lab today. I have canceled and put the orders in for 1 week. Please contact the patient. Toma HUA

## 2018-07-20 ENCOUNTER — HOSPITAL ENCOUNTER (OUTPATIENT)
Dept: CT IMAGING | Facility: CLINIC | Age: 58
Discharge: HOME | End: 2018-07-20
Attending: FAMILY MEDICINE | Admitting: FAMILY MEDICINE
Payer: COMMERCIAL

## 2018-07-20 DIAGNOSIS — R10.2 PELVIC PAIN IN FEMALE: ICD-10-CM

## 2018-07-20 PROCEDURE — 74177 CT ABD & PELVIS W/CONTRAST: CPT

## 2018-07-20 PROCEDURE — 25000125 ZZHC RX 250: Performed by: RADIOLOGY

## 2018-07-20 PROCEDURE — 25000128 H RX IP 250 OP 636: Performed by: RADIOLOGY

## 2018-07-20 RX ORDER — IOPAMIDOL 755 MG/ML
500 INJECTION, SOLUTION INTRAVASCULAR ONCE
Status: COMPLETED | OUTPATIENT
Start: 2018-07-20 | End: 2018-07-20

## 2018-07-20 RX ADMIN — IOPAMIDOL 97 ML: 755 INJECTION, SOLUTION INTRAVENOUS at 12:10

## 2018-07-20 RX ADMIN — SODIUM CHLORIDE 60 ML: 9 INJECTION, SOLUTION INTRAVENOUS at 12:11

## 2018-08-04 LAB — PHQ9 SCORE: 10

## 2019-01-22 ENCOUNTER — TELEPHONE (OUTPATIENT)
Dept: FAMILY MEDICINE | Facility: CLINIC | Age: 59
End: 2019-01-22

## 2019-02-14 ENCOUNTER — APPOINTMENT (OUTPATIENT)
Dept: ULTRASOUND IMAGING | Facility: CLINIC | Age: 59
End: 2019-02-14
Attending: FAMILY MEDICINE
Payer: COMMERCIAL

## 2019-02-14 ENCOUNTER — HOSPITAL ENCOUNTER (EMERGENCY)
Facility: CLINIC | Age: 59
Discharge: HOME | End: 2019-02-14
Attending: FAMILY MEDICINE | Admitting: FAMILY MEDICINE
Payer: COMMERCIAL

## 2019-02-14 ENCOUNTER — TELEPHONE (OUTPATIENT)
Dept: FAMILY MEDICINE | Facility: OTHER | Age: 59
End: 2019-02-14

## 2019-02-14 VITALS
TEMPERATURE: 97.7 F | DIASTOLIC BLOOD PRESSURE: 86 MMHG | WEIGHT: 200.3 LBS | HEART RATE: 53 BPM | OXYGEN SATURATION: 98 % | SYSTOLIC BLOOD PRESSURE: 147 MMHG | BODY MASS INDEX: 30.46 KG/M2 | RESPIRATION RATE: 20 BRPM

## 2019-02-14 DIAGNOSIS — K80.20 CALCULUS OF GALLBLADDER WITHOUT CHOLECYSTITIS WITHOUT OBSTRUCTION: ICD-10-CM

## 2019-02-14 LAB
ALBUMIN SERPL-MCNC: 3.8 G/DL (ref 3.4–5)
ALBUMIN UR-MCNC: NEGATIVE MG/DL
ALP SERPL-CCNC: 79 U/L (ref 40–150)
ALT SERPL W P-5'-P-CCNC: 91 U/L (ref 0–50)
ANION GAP SERPL CALCULATED.3IONS-SCNC: 6 MMOL/L (ref 3–14)
APPEARANCE UR: CLEAR
AST SERPL W P-5'-P-CCNC: 48 U/L (ref 0–45)
BASOPHILS # BLD AUTO: 0 10E9/L (ref 0–0.2)
BASOPHILS NFR BLD AUTO: 0.6 %
BILIRUB DIRECT SERPL-MCNC: 0.1 MG/DL (ref 0–0.2)
BILIRUB SERPL-MCNC: 0.5 MG/DL (ref 0.2–1.3)
BILIRUB UR QL STRIP: NEGATIVE
BUN SERPL-MCNC: 15 MG/DL (ref 7–30)
CALCIUM SERPL-MCNC: 9.1 MG/DL (ref 8.5–10.1)
CHLORIDE SERPL-SCNC: 105 MMOL/L (ref 94–109)
CO2 SERPL-SCNC: 27 MMOL/L (ref 20–32)
COLOR UR AUTO: YELLOW
CREAT SERPL-MCNC: 0.97 MG/DL (ref 0.52–1.04)
DIFFERENTIAL METHOD BLD: NORMAL
EOSINOPHIL NFR BLD AUTO: 3.5 %
ERYTHROCYTE [DISTWIDTH] IN BLOOD BY AUTOMATED COUNT: 13.7 % (ref 10–15)
GFR SERPL CREATININE-BSD FRML MDRD: 64 ML/MIN/{1.73_M2}
GLUCOSE SERPL-MCNC: 99 MG/DL (ref 70–99)
GLUCOSE UR STRIP-MCNC: NEGATIVE MG/DL
HCT VFR BLD AUTO: 43.3 % (ref 35–47)
HGB BLD-MCNC: 14.5 G/DL (ref 11.7–15.7)
HGB UR QL STRIP: NEGATIVE
IMM GRANULOCYTES # BLD: 0 10E9/L (ref 0–0.4)
IMM GRANULOCYTES NFR BLD: 0.3 %
KETONES UR STRIP-MCNC: NEGATIVE MG/DL
LEUKOCYTE ESTERASE UR QL STRIP: NEGATIVE
LIPASE SERPL-CCNC: 126 U/L (ref 73–393)
LYMPHOCYTES # BLD AUTO: 1.7 10E9/L (ref 0.8–5.3)
LYMPHOCYTES NFR BLD AUTO: 24.5 %
MCH RBC QN AUTO: 30.2 PG (ref 26.5–33)
MCHC RBC AUTO-ENTMCNC: 33.5 G/DL (ref 31.5–36.5)
MCV RBC AUTO: 90 FL (ref 78–100)
MONOCYTES # BLD AUTO: 0.6 10E9/L (ref 0–1.3)
MONOCYTES NFR BLD AUTO: 8.6 %
NEUTROPHILS # BLD AUTO: 4.4 10E9/L (ref 1.6–8.3)
NEUTROPHILS NFR BLD AUTO: 62.5 %
NITRATE UR QL: NEGATIVE
NRBC # BLD AUTO: 0 10*3/UL
NRBC BLD AUTO-RTO: 0 /100
PH UR STRIP: 5 PH (ref 5–7)
PLATELET # BLD AUTO: 156 10E9/L (ref 150–450)
POTASSIUM SERPL-SCNC: 3.9 MMOL/L (ref 3.4–5.3)
PROT SERPL-MCNC: 7.6 G/DL (ref 6.8–8.8)
RBC # BLD AUTO: 4.8 10E12/L (ref 3.8–5.2)
SODIUM SERPL-SCNC: 138 MMOL/L (ref 133–144)
SOURCE: NORMAL
SP GR UR STRIP: 1.02 (ref 1–1.03)
TROPONIN I SERPL-MCNC: <0.015 UG/L (ref 0–0.04)
UROBILINOGEN UR STRIP-MCNC: 0 MG/DL (ref 0–2)
WBC # BLD AUTO: 7 10E9/L (ref 4–11)

## 2019-02-14 PROCEDURE — 96374 THER/PROPH/DIAG INJ IV PUSH: CPT | Performed by: FAMILY MEDICINE

## 2019-02-14 PROCEDURE — 84484 ASSAY OF TROPONIN QUANT: CPT | Performed by: FAMILY MEDICINE

## 2019-02-14 PROCEDURE — 85025 COMPLETE CBC W/AUTO DIFF WBC: CPT | Performed by: FAMILY MEDICINE

## 2019-02-14 PROCEDURE — 25000128 H RX IP 250 OP 636: Performed by: FAMILY MEDICINE

## 2019-02-14 PROCEDURE — 99284 EMERGENCY DEPT VISIT MOD MDM: CPT | Mod: 25 | Performed by: FAMILY MEDICINE

## 2019-02-14 PROCEDURE — 96361 HYDRATE IV INFUSION ADD-ON: CPT | Performed by: FAMILY MEDICINE

## 2019-02-14 PROCEDURE — 80048 BASIC METABOLIC PNL TOTAL CA: CPT | Performed by: FAMILY MEDICINE

## 2019-02-14 PROCEDURE — 83690 ASSAY OF LIPASE: CPT | Performed by: FAMILY MEDICINE

## 2019-02-14 PROCEDURE — 80076 HEPATIC FUNCTION PANEL: CPT | Performed by: FAMILY MEDICINE

## 2019-02-14 PROCEDURE — 76705 ECHO EXAM OF ABDOMEN: CPT

## 2019-02-14 PROCEDURE — 81003 URINALYSIS AUTO W/O SCOPE: CPT | Performed by: FAMILY MEDICINE

## 2019-02-14 PROCEDURE — 25000132 ZZH RX MED GY IP 250 OP 250 PS 637: Performed by: FAMILY MEDICINE

## 2019-02-14 PROCEDURE — 99284 EMERGENCY DEPT VISIT MOD MDM: CPT | Mod: Z6 | Performed by: FAMILY MEDICINE

## 2019-02-14 RX ORDER — KETOROLAC TROMETHAMINE 30 MG/ML
30 INJECTION, SOLUTION INTRAMUSCULAR; INTRAVENOUS ONCE
Status: COMPLETED | OUTPATIENT
Start: 2019-02-14 | End: 2019-02-14

## 2019-02-14 RX ORDER — SODIUM CHLORIDE 9 MG/ML
1000 INJECTION, SOLUTION INTRAVENOUS CONTINUOUS
Status: DISCONTINUED | OUTPATIENT
Start: 2019-02-14 | End: 2019-02-14 | Stop reason: HOSPADM

## 2019-02-14 RX ORDER — OXYCODONE AND ACETAMINOPHEN 5; 325 MG/1; MG/1
1 TABLET ORAL ONCE
Status: COMPLETED | OUTPATIENT
Start: 2019-02-14 | End: 2019-02-14

## 2019-02-14 RX ORDER — ONDANSETRON 4 MG/1
4 TABLET, ORALLY DISINTEGRATING ORAL EVERY 8 HOURS PRN
Qty: 10 TABLET | Refills: 0 | Status: SHIPPED | OUTPATIENT
Start: 2019-02-14 | End: 2019-02-20

## 2019-02-14 RX ORDER — OXYCODONE AND ACETAMINOPHEN 5; 325 MG/1; MG/1
1-2 TABLET ORAL EVERY 4 HOURS PRN
Qty: 12 TABLET | Refills: 0 | Status: SHIPPED | OUTPATIENT
Start: 2019-02-14 | End: 2019-02-20

## 2019-02-14 RX ADMIN — SODIUM CHLORIDE 1000 ML: 9 INJECTION, SOLUTION INTRAVENOUS at 12:31

## 2019-02-14 RX ADMIN — OXYCODONE HYDROCHLORIDE AND ACETAMINOPHEN 1 TABLET: 5; 325 TABLET ORAL at 14:23

## 2019-02-14 RX ADMIN — KETOROLAC TROMETHAMINE 30 MG: 30 INJECTION, SOLUTION INTRAMUSCULAR; INTRAVENOUS at 12:31

## 2019-02-14 ASSESSMENT — ENCOUNTER SYMPTOMS
CONSTIPATION: 0
HEMATURIA: 0
NAUSEA: 1
BLOOD IN STOOL: 0
BACK PAIN: 1
ABDOMINAL PAIN: 1
CHILLS: 1
DIARRHEA: 0
FEVER: 0
VOMITING: 0
FREQUENCY: 0
DYSURIA: 0

## 2019-02-14 NOTE — ED AVS SNAPSHOT
[facility] Emergency Department  [address] [place] [address]  Phone:  [phone]  Fax:  [phone]                                    Amisha Lerma   MRN: [id number]    Department:  [facility] Emergency Department   Date of Visit:  2/14/2019           After Visit Summary Signature Page    I have received my discharge instructions, and my questions have been answered. I have discussed any challenges I see with this plan with the nurse or doctor.    ..........................................................................................................................................  Patient/Patient Representative Signature      ..........................................................................................................................................  Patient Representative Print Name and Relationship to Patient    ..................................................               ................................................  Date                                   Time    ..........................................................................................................................................  Reviewed by Signature/Title    ...................................................              ..............................................  Date                                               Time          22EPIC Rev 08/18

## 2019-02-14 NOTE — TELEPHONE ENCOUNTER
"Amisha Lerma is a 58 year old female who calls with right sided abdominal pain.    NURSING ASSESSMENT:  Description:  Patient is calling to report she has been having worsening right sided abdominal pain.  She was seen int he ED and in office in July for this and was told to follow up with her provider for an enlarged spleen.  She states she has not done this.  She states she is currently having chills, nausea, dizziness, radiation of pain to her back.  She is denying the following: black or bloody stools, vomiting blood, inability to stand, fever.  She is instructed to go to the ED for further evaluation.  Patient understands and agrees to this plan.    Onset/duration:  Ongoing since July  Precip. factors:  See previous OV notes  Associated symptoms:  See above  Improves/worsens symptoms:  worsening    Last exam/Treatment:  7/2018  Allergies:   Allergies   Allergen Reactions     Dilaudid [Hydromorphone Hcl]      Makes her \"pukey\" after surgery     Fentanyl      Other reaction(s): Agitation, Intolerance-Can't Take     Nickel        NURSING PLAN: Nursing advice to patient to ED    RECOMMENDED DISPOSITION:  To ED, another person to drive   Will comply with recommendation: Yes  If further questions/concerns or if symptoms do not improve, worsen or new symptoms develop, call your PCP or [facility] as soon as possible.      Guideline used:  Abdominal Pain, Adult  Telephone Triage Protocols for Nurses, Fifth Edition, Nicolette Pulido RN    "

## 2019-02-14 NOTE — ED PROVIDER NOTES
"  History     Chief Complaint   Patient presents with     Abdominal Pain     HPI  Amisha Lerma is a 58 year old female who presents to the ED with abdominal pain. Patient provides she has had intermittent abdominal pain since 07/18. She was evaluated in the ED for similar pain on 7/10/18, and she reports the tests showed unremarkable results and they attributed pain to gynecologic issues.    Patient reports 3 weeks ago the abdominal pain returned; however, she explains pain has increased in severity and has become more consistent. She locates pain over he right abdomen, and states it occasionally radiates to her central abdomen. Patient describes pain as a \"pulling\" sensation, and compares it to menstrual cramping. Patient has been taking 800mg of ibuprofen which temporarily alleviates pain.    Patient also notes chills, constant nausea, and intermittent lower back pain. She denies fevers, constipation, diarrhea, vomiting, dysuria, hematuria, or urinary frequency. Patient has a surgical history including appendectomy.      Allergies:  Allergies   Allergen Reactions     Dilaudid [Hydromorphone Hcl]      Makes her \"pukey\" after surgery     Fentanyl      Other reaction(s): Agitation, Intolerance-Can't Take     Nickel        Problem List:    Patient Active Problem List    Diagnosis Date Noted     Atypical chest pain 02/23/2016     Priority: Medium     Colon polyps 02/07/2014     Priority: Medium     Cholelithiasis 10/01/2013     Priority: Medium     Splenomegaly 10/01/2013     Priority: Medium     CARDIOVASCULAR SCREENING; LDL GOAL LESS THAN 160 07/26/2012     Priority: Medium     Family history of ischemic heart disease 07/17/2012     Priority: Medium     Family history of colon cancer 07/17/2012     Priority: Medium     Family history of skin cancer 07/17/2012     Priority: Medium     Family history of high cholesterol 07/17/2012     Priority: Medium     Anxiety 06/15/2012     Priority: Medium     Major depressive " disorder, single episode, mild (H) 08/01/2008     Priority: Medium     Hypothyroidism 06/15/2012     Priority: Low     Major depression in complete remission (H) 06/15/2012     Priority: Low     Tobacco abuse 06/15/2012     Priority: Low        Past Medical History:    Past Medical History:   Diagnosis Date     Anxiety      Cholelithiasis 10/1/2013     Depressive disorder      Dysmenorrhea      Menometrorrhagia      Splenomegaly 10/1/2013     Substance abuse (H)      Thyroid disease        Past Surgical History:    Past Surgical History:   Procedure Laterality Date     ABDOMEN SURGERY       GYN SURGERY      ablation     LAPAROSCOPIC APPENDECTOMY  7/19/2012    Procedure: LAPAROSCOPIC APPENDECTOMY;  laparoscopic appendectomy, abscess drainage, lysis of adhesions;  Surgeon: Jeffrey Adair MD;  Location: [facility] OR     LAPAROSCOPIC LYSIS ADHESIONS  7/19/2012    Procedure: LAPAROSCOPIC LYSIS ADHESIONS;;  Surgeon: Jeffrey Adair MD;  Location: [facility] OR     ORTHOPEDIC SURGERY         Family History:    Family History   Problem Relation Age of Onset     Cancer Brother         lung and brain       Social History:  Marital Status:   [2]  Social History     Tobacco Use     Smoking status: Current Every Day Smoker     Packs/day: 1.00     Years: 30.00     Pack years: 30.00     Smokeless tobacco: Never Used   Substance Use Topics     Alcohol use: No     Comment: Alcohol free for 19 years     Drug use: No        Medications:      busPIRone (BUSPAR) 15 MG tablet   escitalopram (LEXAPRO) 20 MG tablet   levothyroxine (SYNTHROID) 125 MCG tablet   lorazepam (ATIVAN) 1 MG tablet   OMEPRAZOLE PO       Review of Systems   Constitutional: Positive for chills. Negative for fever.   Gastrointestinal: Positive for abdominal pain (right) and nausea. Negative for blood in stool, constipation, diarrhea and vomiting.   Genitourinary: Negative for dysuria, frequency and hematuria.   Musculoskeletal: Positive for back pain  (intermittent lower).   All other systems reviewed and are negative.      Physical Exam   BP: (!) 128/102  Pulse: 54  Heart Rate: 75  Temp: 97.7  F (36.5  C)  Resp: 20  Weight: 90.9 kg (200 lb 4.8 oz)  SpO2: 98 %      Physical Exam   Constitutional: She appears well-developed and well-nourished. No distress.   HENT:   Head: Normocephalic and atraumatic.   Eyes: Conjunctivae and EOM are normal. Pupils are equal, round, and reactive to light.   Neck: Normal range of motion. Neck supple.   Cardiovascular: Normal rate and regular rhythm.   Pulmonary/Chest: Effort normal and breath sounds normal.   Abdominal: Soft. Bowel sounds are normal. There is tenderness in the right upper quadrant and right lower quadrant.   Musculoskeletal: Normal range of motion.   Neurological: She is alert.   Skin: Skin is warm and dry. Capillary refill takes less than 2 seconds.   Psychiatric: She has a normal mood and affect.   Nursing note and vitals reviewed.      ED Course        Procedures             Results for orders placed or performed during the hospital encounter of 02/14/19 (from the past 24 hour(s))   CBC with platelets differential   Result Value Ref Range    WBC 7.0 4.0 - 11.0 10e9/L    RBC Count 4.80 3.8 - 5.2 10e12/L    Hemoglobin 14.5 11.7 - 15.7 g/dL    Hematocrit 43.3 35.0 - 47.0 %    MCV 90 78 - 100 fl    MCH 30.2 26.5 - 33.0 pg    MCHC 33.5 31.5 - 36.5 g/dL    RDW 13.7 10.0 - 15.0 %    Platelet Count 156 150 - 450 10e9/L    Diff Method Automated Method     % Neutrophils 62.5 %    % Lymphocytes 24.5 %    % Monocytes 8.6 %    % Eosinophils 3.5 %    % Basophils 0.6 %    % Immature Granulocytes 0.3 %    Nucleated RBCs 0 0 /100    Absolute Neutrophil 4.4 1.6 - 8.3 10e9/L    Absolute Lymphocytes 1.7 0.8 - 5.3 10e9/L    Absolute Monocytes 0.6 0.0 - 1.3 10e9/L    Absolute Basophils 0.0 0.0 - 0.2 10e9/L    Abs Immature Granulocytes 0.0 0 - 0.4 10e9/L    Absolute Nucleated RBC 0.0    Basic metabolic panel   Result Value Ref Range     Sodium 138 133 - 144 mmol/L    Potassium 3.9 3.4 - 5.3 mmol/L    Chloride 105 94 - 109 mmol/L    Carbon Dioxide 27 20 - 32 mmol/L    Anion Gap 6 3 - 14 mmol/L    Glucose 99 70 - 99 mg/dL    Urea Nitrogen 15 7 - 30 mg/dL    Creatinine 0.97 0.52 - 1.04 mg/dL    GFR Estimate 64 >60 mL/min/[1.73_m2]    GFR Estimate If Black 75 >60 mL/min/[1.73_m2]    Calcium 9.1 8.5 - 10.1 mg/dL   Lipase   Result Value Ref Range    Lipase 126 73 - 393 U/L   Hepatic panel   Result Value Ref Range    Bilirubin Direct 0.1 0.0 - 0.2 mg/dL    Bilirubin Total 0.5 0.2 - 1.3 mg/dL    Albumin 3.8 3.4 - 5.0 g/dL    Protein Total 7.6 6.8 - 8.8 g/dL    Alkaline Phosphatase 79 40 - 150 U/L    ALT 91 (H) 0 - 50 U/L    AST 48 (H) 0 - 45 U/L   Troponin I   Result Value Ref Range    Troponin I ES <0.015 0.000 - 0.045 ug/L   UA reflex to Microscopic and Culture   Result Value Ref Range    Color Urine Yellow     Appearance Urine Clear     Glucose Urine Negative NEG^Negative mg/dL    Bilirubin Urine Negative NEG^Negative    Ketones Urine Negative NEG^Negative mg/dL    Specific Gravity Urine 1.016 1.003 - 1.035    Blood Urine Negative NEG^Negative    pH Urine 5.0 5.0 - 7.0 pH    Protein Albumin Urine Negative NEG^Negative mg/dL    Urobilinogen mg/dL 0.0 0.0 - 2.0 mg/dL    Nitrite Urine Negative NEG^Negative    Leukocyte Esterase Urine Negative NEG^Negative    Source Midstream Urine    US Abdomen Limited (RUQ)    Narrative    RIGHT UPPER QUADRANT ULTRASOUND 2/14/2019 1:00 PM    HISTORY: Right-sided abdominal pain and back pain, history of  gallstones noted on CT scan last year.    COMPARISON: CT 7/20/2018    FINDINGS:    Gallbladder: There is a large shadowing gallstone and several smaller  stones present. No gallbladder wall thickening or pericholecystic  fluid. No tenderness with direct transducer pressure over the  gallbladder.    Bile ducts: CHD is normal diameter. No intrahepatic biliary  dilatation.    Liver: Normal.     Pancreas: Normal.     Right  kidney: Normal.       Impression    IMPRESSION: Cholelithiasis without evidence of acute cholecystitis.    WILLEM MONTERROSO MD       Medications   0.9% sodium chloride BOLUS (0 mLs Intravenous Stopped 2/14/19 1341)     Followed by   sodium chloride 0.9% infusion (not administered)   ketorolac (TORADOL) injection 30 mg (30 mg Intravenous Given 2/14/19 1231)     Patient is a 58 year old female who presents to the ED with right abdominal pain which has been ongoing for 3 weeks, improved with ibuprofen. Also notes chills, constant nausea, and intermittent lower back pain. Denies any other gastrointestinal symptoms. Patient evaluated in the ED for similar abdominal pain on 7/10/18. Orders included CBC, BMP, UA, pelvic US, and genital wet prep. US showed a few nabothian cysts within the cervix, but US was otherwise negative. CBC showed slightly decreased platelet count, but all other labs including UA and wet prep were unremarkable.  Today we will administer IV fluids and ketorolac. We will order CBC, BMP, UA, lipase, hepatic panel, and troponin. We will also order abdominal ultrasound.    Ultrasound today shows that the patient does have some gallstones.  I think this explains most of her symptoms.  Patient's pain is a little bit better after the above medications were given.  We will have the patient follow-up with general surgery for definitive management of this.  All questions were answered and patient is safe to be discharged home.    Assessments & Plan (with Medical Decision Making)  Cholelithiasis       I have reviewed the nursing notes.    I have reviewed the findings, diagnosis, plan and need for follow up with the patient.              2/14/2019   [facility] EMERGENCY DEPARTMENT    This document serves as a record of services personally performed by Fabian Cosme MD, FAAFP. It was created on their behalf by Vlad Blackman, a trained medical scribe. The creation of this record is based on the  provider's personal observations and the statements of the patient. This document has been checked and approved by the attending provider.       Fabian Cosme MD  02/14/19 0156

## 2019-02-15 ENCOUNTER — OFFICE VISIT (OUTPATIENT)
Dept: SURGERY | Facility: CLINIC | Age: 59
End: 2019-02-15
Payer: COMMERCIAL

## 2019-02-15 ENCOUNTER — TELEPHONE (OUTPATIENT)
Dept: SURGERY | Facility: CLINIC | Age: 59
End: 2019-02-15

## 2019-02-15 VITALS
BODY MASS INDEX: 30.16 KG/M2 | DIASTOLIC BLOOD PRESSURE: 78 MMHG | HEIGHT: 68 IN | WEIGHT: 199 LBS | SYSTOLIC BLOOD PRESSURE: 120 MMHG | TEMPERATURE: 97.1 F

## 2019-02-15 DIAGNOSIS — K80.20 SYMPTOMATIC CHOLELITHIASIS: Primary | ICD-10-CM

## 2019-02-15 PROCEDURE — 99204 OFFICE O/P NEW MOD 45 MIN: CPT | Performed by: SURGERY

## 2019-02-15 RX ORDER — CEFAZOLIN SODIUM 1 G/50ML
1 SOLUTION INTRAVENOUS SEE ADMIN INSTRUCTIONS
Status: CANCELLED | OUTPATIENT
Start: 2019-02-15

## 2019-02-15 RX ORDER — CEFAZOLIN SODIUM 2 G/100ML
2 INJECTION, SOLUTION INTRAVENOUS
Status: CANCELLED | OUTPATIENT
Start: 2019-02-15

## 2019-02-15 ASSESSMENT — MIFFLIN-ST. JEOR: SCORE: 1523.22

## 2019-02-15 NOTE — TELEPHONE ENCOUNTER
Type of surgery: laparoscopic cholecystectomy, possible open  Location of surgery: [facility] OR  Date and time of surgery: 3/4  Surgeon: Ricky  Pre-Op Appt Date: 2/21  Post-Op Appt Date: 3/14   Packet sent out: Yes  Pre-cert/Authorization completed:  Not Applicable  Date: na

## 2019-02-15 NOTE — PROGRESS NOTES
Patient seen in consultation for gallstones by [place] [facility]    HPI:  Patient is a 58 year old female with several month history of vague but persistent R sided abdominal pain. She had CT scan last year which showed large gallstones but at the time her pain was not attributed to her gallbladder. She denies any particular food association but she does endorse early satiety and some weight loss. She denies jaundice. She had an appendectomy with mucocele (per patient). She was seen in the ED yesterday with this pain that was radiating to her back and was found to have a very mild elevation of her LFTs and an US showing again the large gallstones.    Review Of Systems    Skin: negative  Ears/Nose/Throat: negative  Respiratory: No shortness of breath, dyspnea on exertion, cough, or hemoptysis  Cardiovascular: negative  Gastrointestinal: as above  Genitourinary: negative  Musculoskeletal: negative  Neurologic: negative  Hematologic/Lymphatic/Immunologic: negative  Endocrine: negative      Past Medical History:   Diagnosis Date     Anxiety      Cholelithiasis 10/1/2013     Depressive disorder      Dysmenorrhea      Menometrorrhagia      Splenomegaly 10/1/2013     Substance abuse (H)      Thyroid disease        Past Surgical History:   Procedure Laterality Date     ABDOMEN SURGERY       GYN SURGERY      ablation     LAPAROSCOPIC APPENDECTOMY  7/19/2012    Procedure: LAPAROSCOPIC APPENDECTOMY;  laparoscopic appendectomy, abscess drainage, lysis of adhesions;  Surgeon: Jeffrey Adair MD;  Location: [facility]     LAPAROSCOPIC LYSIS ADHESIONS  7/19/2012    Procedure: LAPAROSCOPIC LYSIS ADHESIONS;;  Surgeon: Jeffrey Adair MD;  Location: [facility]     ORTHOPEDIC SURGERY         Family History   Problem Relation Age of Onset     Cancer Brother         lung and brain       Social History     Socioeconomic History     Marital status:      Spouse name: Not on file     Number of children: Not on file     Years of  "education: Not on file     Highest education level: Not on file   Social Needs     Financial resource strain: Not on file     Food insecurity - worry: Not on file     Food insecurity - inability: Not on file     Transportation needs - medical: Not on file     Transportation needs - non-medical: Not on file   Occupational History     Not on file   Tobacco Use     Smoking status: Current Every Day Smoker     Packs/day: 1.00     Years: 30.00     Pack years: 30.00     Smokeless tobacco: Never Used   Substance and Sexual Activity     Alcohol use: No     Comment: Alcohol free for 19 years     Drug use: No     Sexual activity: Yes     Partners: Male     Birth control/protection: None   Other Topics Concern     Parent/sibling w/ CABG, MI or angioplasty before 65F 55M? Not Asked   Social History Narrative     Not on file       Current Outpatient Medications   Medication Sig Dispense Refill     busPIRone (BUSPAR) 15 MG tablet Take 15 mg by mouth       escitalopram (LEXAPRO) 20 MG tablet Take 20 mg by mouth daily.       levothyroxine (SYNTHROID) 125 MCG tablet Take 125 mcg by mouth daily.       lorazepam (ATIVAN) 1 MG tablet Take 1 mg by mouth 2 times daily as needed.       OMEPRAZOLE PO Take 20 mg by mouth every other day       ondansetron (ZOFRAN ODT) 4 MG ODT tab Take 1 tablet (4 mg) by mouth every 8 hours as needed for nausea 10 tablet 0     oxyCODONE-acetaminophen (PERCOCET) 5-325 MG tablet Take 1-2 tablets by mouth every 4 hours as needed for pain 12 tablet 0       Medications and history reviewed    Physical exam:  Vitals: /78   Temp 97.1  F (36.2  C) (Temporal)   Ht 1.715 m (5' 7.5\")   Wt 90.3 kg (199 lb)   BMI 30.71 kg/m    BMI= Body mass index is 30.71 kg/m .    Constitutional: Healthy, alert, non-distressed   Head: Normo-cephalic, atraumatic, no lesions, masses or tenderness   Cardiovascular: RRR, no new murmurs, +S1, +S2 heart sounds, no clicks, rubs or gallops   Respiratory: CTAB, no rales, rhonchi or " wheezing, equal chest rise, good respiratory effort   Gastrointestinal: Soft, non-tender, non distended, no rebound rigidity or guarding, no masses or hernias palpated   : Deferred  Musculoskeletal: Moves all extremities, normal  strength, no deformities noted   Skin: No suspicious lesions or rashes   Psychiatric: Mentation appears normal, affect appropriate   Hematologic/Lymphatic/Immunologic: Normal cervical and supraclavicular lymph nodes   Patient able to get up on table without difficulty.    Labs show:  Results for orders placed or performed during the hospital encounter of 02/14/19 (from the past 24 hour(s))   CBC with platelets differential   Result Value Ref Range    WBC 7.0 4.0 - 11.0 10e9/L    RBC Count 4.80 3.8 - 5.2 10e12/L    Hemoglobin 14.5 11.7 - 15.7 g/dL    Hematocrit 43.3 35.0 - 47.0 %    MCV 90 78 - 100 fl    MCH 30.2 26.5 - 33.0 pg    MCHC 33.5 31.5 - 36.5 g/dL    RDW 13.7 10.0 - 15.0 %    Platelet Count 156 150 - 450 10e9/L    Diff Method Automated Method     % Neutrophils 62.5 %    % Lymphocytes 24.5 %    % Monocytes 8.6 %    % Eosinophils 3.5 %    % Basophils 0.6 %    % Immature Granulocytes 0.3 %    Nucleated RBCs 0 0 /100    Absolute Neutrophil 4.4 1.6 - 8.3 10e9/L    Absolute Lymphocytes 1.7 0.8 - 5.3 10e9/L    Absolute Monocytes 0.6 0.0 - 1.3 10e9/L    Absolute Basophils 0.0 0.0 - 0.2 10e9/L    Abs Immature Granulocytes 0.0 0 - 0.4 10e9/L    Absolute Nucleated RBC 0.0    Basic metabolic panel   Result Value Ref Range    Sodium 138 133 - 144 mmol/L    Potassium 3.9 3.4 - 5.3 mmol/L    Chloride 105 94 - 109 mmol/L    Carbon Dioxide 27 20 - 32 mmol/L    Anion Gap 6 3 - 14 mmol/L    Glucose 99 70 - 99 mg/dL    Urea Nitrogen 15 7 - 30 mg/dL    Creatinine 0.97 0.52 - 1.04 mg/dL    GFR Estimate 64 >60 mL/min/[1.73_m2]    GFR Estimate If Black 75 >60 mL/min/[1.73_m2]    Calcium 9.1 8.5 - 10.1 mg/dL   Lipase   Result Value Ref Range    Lipase 126 73 - 393 U/L   Hepatic panel   Result Value  Ref Range    Bilirubin Direct 0.1 0.0 - 0.2 mg/dL    Bilirubin Total 0.5 0.2 - 1.3 mg/dL    Albumin 3.8 3.4 - 5.0 g/dL    Protein Total 7.6 6.8 - 8.8 g/dL    Alkaline Phosphatase 79 40 - 150 U/L    ALT 91 (H) 0 - 50 U/L    AST 48 (H) 0 - 45 U/L   Troponin I   Result Value Ref Range    Troponin I ES <0.015 0.000 - 0.045 ug/L   UA reflex to Microscopic and Culture   Result Value Ref Range    Color Urine Yellow     Appearance Urine Clear     Glucose Urine Negative NEG^Negative mg/dL    Bilirubin Urine Negative NEG^Negative    Ketones Urine Negative NEG^Negative mg/dL    Specific Gravity Urine 1.016 1.003 - 1.035    Blood Urine Negative NEG^Negative    pH Urine 5.0 5.0 - 7.0 pH    Protein Albumin Urine Negative NEG^Negative mg/dL    Urobilinogen mg/dL 0.0 0.0 - 2.0 mg/dL    Nitrite Urine Negative NEG^Negative    Leukocyte Esterase Urine Negative NEG^Negative    Source Midstream Urine    US Abdomen Limited (RUQ)    Narrative    RIGHT UPPER QUADRANT ULTRASOUND 2/14/2019 1:00 PM    HISTORY: Right-sided abdominal pain and back pain, history of  gallstones noted on CT scan last year.    COMPARISON: CT 7/20/2018    FINDINGS:    Gallbladder: There is a large shadowing gallstone and several smaller  stones present. No gallbladder wall thickening or pericholecystic  fluid. No tenderness with direct transducer pressure over the  gallbladder.    Bile ducts: CHD is normal diameter. No intrahepatic biliary  dilatation.    Liver: Normal.     Pancreas: Normal.     Right kidney: Normal.       Impression    IMPRESSION: Cholelithiasis without evidence of acute cholecystitis.    WILLEM MONTERROSO MD         Assessment:     ICD-10-CM    1. Symptomatic cholelithiasis K80.20 [facility]-Operative Worksheet - Laparoscopic Cholecystectomy     Plan: We had a long, detailed discussion about gallstones and the issues they can cause. I do feel as though she would benefit from cholecystectomy and we discussed the procedure at length. Discussed imaging  findings and what to expect with surgery. Risks of bleeding, infection, anesthesia complications, conversion to open, injury to nearby structures and bile duct injury all discussed.   Patient questions answered and we will schedule the procedure.      Antoine García, DO

## 2019-02-15 NOTE — LETTER
2/15/2019         RE: Amisha Lerma  [address]  [place] [address]        Dear Colleague,    Thank you for referring your patient, Amisha Lerma, to the [facility]. Please see a copy of my visit note below.    Patient seen in consultation for gallstones by [place] [facility]    HPI:  Patient is a 58 year old female with several month history of vague but persistent R sided abdominal pain. She had CT scan last year which showed large gallstones but at the time her pain was not attributed to her gallbladder. She denies any particular food association but she does endorse early satiety and some weight loss. She denies jaundice. She had an appendectomy with mucocele (per patient). She was seen in the ED yesterday with this pain that was radiating to her back and was found to have a very mild elevation of her LFTs and an US showing again the large gallstones.    Review Of Systems    Skin: negative  Ears/Nose/Throat: negative  Respiratory: No shortness of breath, dyspnea on exertion, cough, or hemoptysis  Cardiovascular: negative  Gastrointestinal: as above  Genitourinary: negative  Musculoskeletal: negative  Neurologic: negative  Hematologic/Lymphatic/Immunologic: negative  Endocrine: negative      Past Medical History:   Diagnosis Date     Anxiety      Cholelithiasis 10/1/2013     Depressive disorder      Dysmenorrhea      Menometrorrhagia      Splenomegaly 10/1/2013     Substance abuse (H)      Thyroid disease        Past Surgical History:   Procedure Laterality Date     ABDOMEN SURGERY       GYN SURGERY      ablation     LAPAROSCOPIC APPENDECTOMY  7/19/2012    Procedure: LAPAROSCOPIC APPENDECTOMY;  laparoscopic appendectomy, abscess drainage, lysis of adhesions;  Surgeon: Jeffrey Adair MD;  Location:  OR     LAPAROSCOPIC LYSIS ADHESIONS  7/19/2012    Procedure: LAPAROSCOPIC LYSIS ADHESIONS;;  Surgeon: Jeffrey Adair MD;  Location:  OR     ORTHOPEDIC SURGERY         Family  "History   Problem Relation Age of Onset     Cancer Brother         lung and brain       Social History     Socioeconomic History     Marital status:      Spouse name: Not on file     Number of children: Not on file     Years of education: Not on file     Highest education level: Not on file   Social Needs     Financial resource strain: Not on file     Food insecurity - worry: Not on file     Food insecurity - inability: Not on file     Transportation needs - medical: Not on file     Transportation needs - non-medical: Not on file   Occupational History     Not on file   Tobacco Use     Smoking status: Current Every Day Smoker     Packs/day: 1.00     Years: 30.00     Pack years: 30.00     Smokeless tobacco: Never Used   Substance and Sexual Activity     Alcohol use: No     Comment: Alcohol free for 19 years     Drug use: No     Sexual activity: Yes     Partners: Male     Birth control/protection: None   Other Topics Concern     Parent/sibling w/ CABG, MI or angioplasty before 65F 55M? Not Asked   Social History Narrative     Not on file       Current Outpatient Medications   Medication Sig Dispense Refill     busPIRone (BUSPAR) 15 MG tablet Take 15 mg by mouth       escitalopram (LEXAPRO) 20 MG tablet Take 20 mg by mouth daily.       levothyroxine (SYNTHROID) 125 MCG tablet Take 125 mcg by mouth daily.       lorazepam (ATIVAN) 1 MG tablet Take 1 mg by mouth 2 times daily as needed.       OMEPRAZOLE PO Take 20 mg by mouth every other day       ondansetron (ZOFRAN ODT) 4 MG ODT tab Take 1 tablet (4 mg) by mouth every 8 hours as needed for nausea 10 tablet 0     oxyCODONE-acetaminophen (PERCOCET) 5-325 MG tablet Take 1-2 tablets by mouth every 4 hours as needed for pain 12 tablet 0       Medications and history reviewed    Physical exam:  Vitals: /78   Temp 97.1  F (36.2  C) (Temporal)   Ht 1.715 m (5' 7.5\")   Wt 90.3 kg (199 lb)   BMI 30.71 kg/m     BMI= Body mass index is 30.71 " kg/m .    Constitutional: Healthy, alert, non-distressed   Head: Normo-cephalic, atraumatic, no lesions, masses or tenderness   Cardiovascular: RRR, no new murmurs, +S1, +S2 heart sounds, no clicks, rubs or gallops   Respiratory: CTAB, no rales, rhonchi or wheezing, equal chest rise, good respiratory effort   Gastrointestinal: Soft, non-tender, non distended, no rebound rigidity or guarding, no masses or hernias palpated   : Deferred  Musculoskeletal: Moves all extremities, normal  strength, no deformities noted   Skin: No suspicious lesions or rashes   Psychiatric: Mentation appears normal, affect appropriate   Hematologic/Lymphatic/Immunologic: Normal cervical and supraclavicular lymph nodes   Patient able to get up on table without difficulty.    Labs show:  Results for orders placed or performed during the hospital encounter of 02/14/19 (from the past 24 hour(s))   CBC with platelets differential   Result Value Ref Range    WBC 7.0 4.0 - 11.0 10e9/L    RBC Count 4.80 3.8 - 5.2 10e12/L    Hemoglobin 14.5 11.7 - 15.7 g/dL    Hematocrit 43.3 35.0 - 47.0 %    MCV 90 78 - 100 fl    MCH 30.2 26.5 - 33.0 pg    MCHC 33.5 31.5 - 36.5 g/dL    RDW 13.7 10.0 - 15.0 %    Platelet Count 156 150 - 450 10e9/L    Diff Method Automated Method     % Neutrophils 62.5 %    % Lymphocytes 24.5 %    % Monocytes 8.6 %    % Eosinophils 3.5 %    % Basophils 0.6 %    % Immature Granulocytes 0.3 %    Nucleated RBCs 0 0 /100    Absolute Neutrophil 4.4 1.6 - 8.3 10e9/L    Absolute Lymphocytes 1.7 0.8 - 5.3 10e9/L    Absolute Monocytes 0.6 0.0 - 1.3 10e9/L    Absolute Basophils 0.0 0.0 - 0.2 10e9/L    Abs Immature Granulocytes 0.0 0 - 0.4 10e9/L    Absolute Nucleated RBC 0.0    Basic metabolic panel   Result Value Ref Range    Sodium 138 133 - 144 mmol/L    Potassium 3.9 3.4 - 5.3 mmol/L    Chloride 105 94 - 109 mmol/L    Carbon Dioxide 27 20 - 32 mmol/L    Anion Gap 6 3 - 14 mmol/L    Glucose 99 70 - 99 mg/dL    Urea Nitrogen 15 7 - 30  mg/dL    Creatinine 0.97 0.52 - 1.04 mg/dL    GFR Estimate 64 >60 mL/min/[1.73_m2]    GFR Estimate If Black 75 >60 mL/min/[1.73_m2]    Calcium 9.1 8.5 - 10.1 mg/dL   Lipase   Result Value Ref Range    Lipase 126 73 - 393 U/L   Hepatic panel   Result Value Ref Range    Bilirubin Direct 0.1 0.0 - 0.2 mg/dL    Bilirubin Total 0.5 0.2 - 1.3 mg/dL    Albumin 3.8 3.4 - 5.0 g/dL    Protein Total 7.6 6.8 - 8.8 g/dL    Alkaline Phosphatase 79 40 - 150 U/L    ALT 91 (H) 0 - 50 U/L    AST 48 (H) 0 - 45 U/L   Troponin I   Result Value Ref Range    Troponin I ES <0.015 0.000 - 0.045 ug/L   UA reflex to Microscopic and Culture   Result Value Ref Range    Color Urine Yellow     Appearance Urine Clear     Glucose Urine Negative NEG^Negative mg/dL    Bilirubin Urine Negative NEG^Negative    Ketones Urine Negative NEG^Negative mg/dL    Specific Gravity Urine 1.016 1.003 - 1.035    Blood Urine Negative NEG^Negative    pH Urine 5.0 5.0 - 7.0 pH    Protein Albumin Urine Negative NEG^Negative mg/dL    Urobilinogen mg/dL 0.0 0.0 - 2.0 mg/dL    Nitrite Urine Negative NEG^Negative    Leukocyte Esterase Urine Negative NEG^Negative    Source Midstream Urine    US Abdomen Limited (RUQ)    Narrative    RIGHT UPPER QUADRANT ULTRASOUND 2/14/2019 1:00 PM    HISTORY: Right-sided abdominal pain and back pain, history of  gallstones noted on CT scan last year.    COMPARISON: CT 7/20/2018    FINDINGS:    Gallbladder: There is a large shadowing gallstone and several smaller  stones present. No gallbladder wall thickening or pericholecystic  fluid. No tenderness with direct transducer pressure over the  gallbladder.    Bile ducts: CHD is normal diameter. No intrahepatic biliary  dilatation.    Liver: Normal.     Pancreas: Normal.     Right kidney: Normal.       Impression    IMPRESSION: Cholelithiasis without evidence of acute cholecystitis.    WILLEM MONTERROSO MD         Assessment:     ICD-10-CM    1. Symptomatic cholelithiasis K80.20 [facility]-Operative  Worksheet - Laparoscopic Cholecystectomy     Plan: We had a long, detailed discussion about gallstones and the issues they can cause. I do feel as though she would benefit from cholecystectomy and we discussed the procedure at length. Discussed imaging findings and what to expect with surgery. Risks of bleeding, infection, anesthesia complications, conversion to open, injury to nearby structures and bile duct injury all discussed.   Patient questions answered and we will schedule the procedure.      Antoine García, DO      Again, thank you for allowing me to participate in the care of your patient.        Sincerely,        Antoine García, DO

## 2019-02-20 ENCOUNTER — TELEPHONE (OUTPATIENT)
Dept: SURGERY | Facility: CLINIC | Age: 59
End: 2019-02-20

## 2019-02-20 DIAGNOSIS — K80.20 SYMPTOMATIC CHOLELITHIASIS: Primary | ICD-10-CM

## 2019-02-20 RX ORDER — OXYCODONE AND ACETAMINOPHEN 5; 325 MG/1; MG/1
1-2 TABLET ORAL EVERY 4 HOURS PRN
Qty: 12 TABLET | Refills: 0 | Status: SHIPPED | OUTPATIENT
Start: 2019-02-20 | End: 2019-10-15

## 2019-02-20 RX ORDER — ONDANSETRON 4 MG/1
4 TABLET, ORALLY DISINTEGRATING ORAL EVERY 8 HOURS PRN
Qty: 10 TABLET | Refills: 0 | Status: SHIPPED | OUTPATIENT
Start: 2019-02-20 | End: 2019-10-15

## 2019-02-20 NOTE — TELEPHONE ENCOUNTER
I spoke with patient. She would like  to  script from [place] today.  Deisy Gleason RN on 2/20/2019 at 9:46 AM

## 2019-02-20 NOTE — TELEPHONE ENCOUNTER
Reason for Call:  Other call back and prescription    Detailed comments: Patient has surgery scheduled for gallbladder removal.  She needs her nausea and pain medication refilled and would like them sent to the [facility] in [place].  She would also like to move the surgery up if there are any cancellations.  Please call patient to let her know about the refills.  Thanks    Phone Number Patient can be reached at: Home number on file [phone] (home)    Best Time: any    Can we leave a detailed message on this number? YES    Call taken on 2/20/2019 at 9:09 AM by Marry Kang

## 2019-02-22 ENCOUNTER — OFFICE VISIT (OUTPATIENT)
Dept: FAMILY MEDICINE | Facility: OTHER | Age: 59
End: 2019-02-22
Payer: COMMERCIAL

## 2019-02-22 VITALS
OXYGEN SATURATION: 95 % | BODY MASS INDEX: 30.78 KG/M2 | SYSTOLIC BLOOD PRESSURE: 120 MMHG | RESPIRATION RATE: 16 BRPM | TEMPERATURE: 97.5 F | DIASTOLIC BLOOD PRESSURE: 78 MMHG | WEIGHT: 199.5 LBS | HEART RATE: 56 BPM

## 2019-02-22 DIAGNOSIS — K80.20 CALCULUS OF GALLBLADDER WITHOUT CHOLECYSTITIS WITHOUT OBSTRUCTION: ICD-10-CM

## 2019-02-22 DIAGNOSIS — R79.89 ABNORMAL LFTS: ICD-10-CM

## 2019-02-22 DIAGNOSIS — Z01.818 PREOP GENERAL PHYSICAL EXAM: Primary | ICD-10-CM

## 2019-02-22 DIAGNOSIS — E03.8 OTHER SPECIFIED HYPOTHYROIDISM: ICD-10-CM

## 2019-02-22 DIAGNOSIS — E78.5 HYPERLIPIDEMIA LDL GOAL <130: ICD-10-CM

## 2019-02-22 DIAGNOSIS — Z72.0 TOBACCO ABUSE: ICD-10-CM

## 2019-02-22 DIAGNOSIS — Z80.0 FAMILY HISTORY OF COLON CANCER: ICD-10-CM

## 2019-02-22 DIAGNOSIS — F12.90 MARIJUANA USE, EPISODIC: ICD-10-CM

## 2019-02-22 LAB
ALBUMIN SERPL-MCNC: 3.7 G/DL (ref 3.4–5)
ALBUMIN UR-MCNC: NEGATIVE MG/DL
ALP SERPL-CCNC: 86 U/L (ref 40–150)
ALT SERPL W P-5'-P-CCNC: 303 U/L (ref 0–50)
ANION GAP SERPL CALCULATED.3IONS-SCNC: 7 MMOL/L (ref 3–14)
APPEARANCE UR: CLEAR
AST SERPL W P-5'-P-CCNC: 196 U/L (ref 0–45)
BILIRUB SERPL-MCNC: 0.5 MG/DL (ref 0.2–1.3)
BILIRUB UR QL STRIP: NEGATIVE
BUN SERPL-MCNC: 15 MG/DL (ref 7–30)
CALCIUM SERPL-MCNC: 9.5 MG/DL (ref 8.5–10.1)
CHLORIDE SERPL-SCNC: 102 MMOL/L (ref 94–109)
CO2 SERPL-SCNC: 28 MMOL/L (ref 20–32)
COLOR UR AUTO: YELLOW
CREAT SERPL-MCNC: 0.99 MG/DL (ref 0.52–1.04)
GFR SERPL CREATININE-BSD FRML MDRD: 62 ML/MIN/{1.73_M2}
GLUCOSE SERPL-MCNC: 85 MG/DL (ref 70–99)
GLUCOSE UR STRIP-MCNC: NEGATIVE MG/DL
HGB UR QL STRIP: NEGATIVE
KETONES UR STRIP-MCNC: NEGATIVE MG/DL
LDLC SERPL DIRECT ASSAY-MCNC: 235 MG/DL
LEUKOCYTE ESTERASE UR QL STRIP: ABNORMAL
LIPASE SERPL-CCNC: 170 U/L (ref 73–393)
MUCOUS THREADS #/AREA URNS LPF: PRESENT /LPF
NITRATE UR QL: NEGATIVE
PH UR STRIP: 6 PH (ref 5–7)
POTASSIUM SERPL-SCNC: 4.2 MMOL/L (ref 3.4–5.3)
PROT SERPL-MCNC: 7.6 G/DL (ref 6.8–8.8)
RBC #/AREA URNS AUTO: ABNORMAL /HPF
SODIUM SERPL-SCNC: 137 MMOL/L (ref 133–144)
SOURCE: ABNORMAL
SP GR UR STRIP: <=1.005 (ref 1–1.03)
UROBILINOGEN UR STRIP-ACNC: 1 EU/DL (ref 0.2–1)
WBC #/AREA URNS AUTO: ABNORMAL /HPF

## 2019-02-22 PROCEDURE — 83721 ASSAY OF BLOOD LIPOPROTEIN: CPT | Performed by: PHYSICIAN ASSISTANT

## 2019-02-22 PROCEDURE — 82150 ASSAY OF AMYLASE: CPT | Performed by: PHYSICIAN ASSISTANT

## 2019-02-22 PROCEDURE — 99214 OFFICE O/P EST MOD 30 MIN: CPT | Performed by: PHYSICIAN ASSISTANT

## 2019-02-22 PROCEDURE — 93000 ELECTROCARDIOGRAM COMPLETE: CPT | Performed by: PHYSICIAN ASSISTANT

## 2019-02-22 PROCEDURE — 83690 ASSAY OF LIPASE: CPT | Performed by: PHYSICIAN ASSISTANT

## 2019-02-22 PROCEDURE — 36415 COLL VENOUS BLD VENIPUNCTURE: CPT | Performed by: PHYSICIAN ASSISTANT

## 2019-02-22 PROCEDURE — 81001 URINALYSIS AUTO W/SCOPE: CPT | Performed by: PHYSICIAN ASSISTANT

## 2019-02-22 PROCEDURE — 80053 COMPREHEN METABOLIC PANEL: CPT | Performed by: PHYSICIAN ASSISTANT

## 2019-02-22 RX ORDER — BUSPIRONE HYDROCHLORIDE 30 MG/1
TABLET ORAL
Refills: 0 | COMMUNITY
Start: 2019-01-29 | End: 2019-10-15

## 2019-02-22 RX ORDER — ALBUTEROL SULFATE 90 UG/1
AEROSOL, METERED RESPIRATORY (INHALATION)
Refills: 0 | COMMUNITY
Start: 2019-01-30

## 2019-02-22 RX ORDER — VALACYCLOVIR HYDROCHLORIDE 1 G/1
1 TABLET, FILM COATED ORAL
COMMUNITY
Start: 2018-04-03

## 2019-02-22 ASSESSMENT — PAIN SCALES - GENERAL: PAINLEVEL: SEVERE PAIN (6)

## 2019-02-22 NOTE — PROGRESS NOTES
[facility]  [id number] [place] [id number]  [phone]  Dept: [phone]    PRE-OP EVALUATION:  Today's date: 2019    Amisha Lerma (: 1960) presents for pre-operative evaluation assessment as requested by Dr. García.  She requires evaluation and anesthesia risk assessment prior to undergoing surgery/procedure for treatment of gall bladder disease. .    Proposed Surgery/ Procedure: Laparoscopic Cholecystectomy  Date of Surgery/ Procedure:   Time of Surgery/ Procedure: 215  Hospital/Surgical Facility: [facility]  Fax number for surgical facility:   Primary Physician: No Ref-Primary, Physician  Type of Anesthesia Anticipated: General    Patient has a Health Care Directive or Living Will:  NO    1. NO - Do you have a history of heart attack, stroke, stent, bypass or surgery on an artery in the head, neck, heart or legs?  2. NO - Do you ever have any pain or discomfort in your chest?  3. NO - Do you have a history of  Heart Failure?  4. NO - Are you troubled by shortness of breath when: walking on the level, up a slight hill or at night?  5. NO - Do you currently have a cold, bronchitis or other respiratory infection?  6. NO - Do you have a cough, shortness of breath or wheezing?  7. NO - Do you sometimes get pains in the calves of your legs when you walk?  8. NO - Do you or anyone in your family have previous history of blood clots?  9. NO - Do you or does anyone in your family have a serious bleeding problem such as prolonged bleeding following surgeries or cuts?  10. NO - Have you ever had problems with anemia or been told to take iron pills?  11. NO - Have you had any abnormal blood loss such as black, tarry or bloody stools, or abnormal vaginal bleeding?  12. NO - Have you ever had a blood transfusion?  13. NO - Have you or any of your relatives ever had problems with anesthesia?  14. NO - Do you have sleep apnea, excessive snoring or daytime  drowsiness?  15. NO - Do you have any prosthetic heart valves?  16. NO - Do you have prosthetic joints?  17. NO - Is there any chance that you may be pregnant?      HPI:     HPI related to upcoming procedure: Long-standing history of gall bladder stones with recent increase in signs and symptoms finally causing her to seek surgical intervention.      See problem list for active medical problems.  Problems all longstanding and stable, except as noted/documented.  See ROS for pertinent symptoms related to these conditions.                                                                                                                                                          .    MEDICAL HISTORY:     Patient Active Problem List    Diagnosis Date Noted     Atypical chest pain 02/23/2016     Priority: Medium     Colon polyps 02/07/2014     Priority: Medium     Cholelithiasis 10/01/2013     Priority: Medium     Splenomegaly 10/01/2013     Priority: Medium     CARDIOVASCULAR SCREENING; LDL GOAL LESS THAN 160 07/26/2012     Priority: Medium     Family history of ischemic heart disease 07/17/2012     Priority: Medium     Family history of colon cancer 07/17/2012     Priority: Medium     Family history of skin cancer 07/17/2012     Priority: Medium     Family history of high cholesterol 07/17/2012     Priority: Medium     Anxiety 06/15/2012     Priority: Medium     Major depressive disorder, single episode, mild (H) 08/01/2008     Priority: Medium     Hypothyroidism 06/15/2012     Priority: Low     Major depression in complete remission (H) 06/15/2012     Priority: Low     Tobacco abuse 06/15/2012     Priority: Low      Past Medical History:   Diagnosis Date     Anxiety      Cholelithiasis 10/1/2013     Depressive disorder      Dysmenorrhea      Menometrorrhagia      Splenomegaly 10/1/2013     Substance abuse (H)      Thyroid disease      Past Surgical History:   Procedure Laterality Date     ABDOMEN SURGERY       GYN SURGERY   "    ablation     LAPAROSCOPIC APPENDECTOMY  7/19/2012    Procedure: LAPAROSCOPIC APPENDECTOMY;  laparoscopic appendectomy, abscess drainage, lysis of adhesions;  Surgeon: Jeffrey Adair MD;  Location: [facility] OR     LAPAROSCOPIC LYSIS ADHESIONS  7/19/2012    Procedure: LAPAROSCOPIC LYSIS ADHESIONS;;  Surgeon: Jeffrey Adair MD;  Location:  OR     ORTHOPEDIC SURGERY       Current Outpatient Medications   Medication Sig Dispense Refill     busPIRone (BUSPAR) 15 MG tablet Take 15 mg by mouth       escitalopram (LEXAPRO) 20 MG tablet Take 20 mg by mouth daily.       levothyroxine (SYNTHROID) 125 MCG tablet Take 125 mcg by mouth daily.       lorazepam (ATIVAN) 1 MG tablet Take 1 mg by mouth 2 times daily as needed.       OMEPRAZOLE PO Take 20 mg by mouth every other day       ondansetron (ZOFRAN ODT) 4 MG ODT tab Take 1 tablet (4 mg) by mouth every 8 hours as needed for nausea 10 tablet 0     oxyCODONE-acetaminophen (PERCOCET) 5-325 MG tablet Take 1-2 tablets by mouth every 4 hours as needed for pain 12 tablet 0     OTC products: no recent use of OTC ASA, NSAIDS or Steroids    Allergies   Allergen Reactions     Dilaudid [Hydromorphone Hcl]      Makes her \"pukey\" after surgery     Fentanyl      Other reaction(s): Agitation, Intolerance-Can't Take     Nickel       Latex Allergy: NO    Social History     Tobacco Use     Smoking status: Current Every Day Smoker     Packs/day: 1.00     Years: 30.00     Pack years: 30.00     Smokeless tobacco: Never Used   Substance Use Topics     Alcohol use: No     Comment: Alcohol free for 19 years     History   Drug Use No       REVIEW OF SYSTEMS:   CONSTITUTIONAL: NEGATIVE for fever, chills, change in weight  INTEGUMENTARY/SKIN: NEGATIVE for worrisome rashes, moles or lesions  EYES: NEGATIVE for vision changes or irritation  ENT/MOUTH: NEGATIVE for ear, mouth and throat problems  RESP: NEGATIVE for significant cough or SOB  CV: NEGATIVE for chest pain, palpitations or " peripheral edema  GI: POSITIVE for abdominal pain epigastric and RUQ, constipation and nausea and NEGATIVE for diarrhea and vomiting  : NEGATIVE for frequency, dysuria, or hematuria  MUSCULOSKELETAL: NEGATIVE for significant arthralgias or myalgia  NEURO: NEGATIVE for weakness, dizziness or paresthesias  ENDOCRINE: NEGATIVE for temperature intolerance, skin/hair changes  HEME: NEGATIVE for bleeding problems  PSYCHIATRIC: HX anxiety, HX depression and drug usage    EXAM:   There were no vitals taken for this visit.    GENERAL APPEARANCE: healthy, alert and no distress     EYES: EOMI, PERRL     HENT: ear canals and TM's normal and nose and mouth without ulcers or lesions     NECK: no adenopathy, no asymmetry, masses, or scars and thyroid normal to palpation     RESP: lungs clear to auscultation - no rales, rhonchi or wheezes     CV: regular rates and rhythm, normal S1 S2, no S3 or S4 and no murmur, click or rub     ABDOMEN: bowel sounds normal and tender mildly to the epigastrium today.  Surprisingly negative for right upper quadrant pain at this point in time.     MS: extremities normal- no gross deformities noted, no evidence of inflammation in joints, FROM in all extremities.     SKIN: no suspicious lesions or rashes     NEURO: Normal strength and tone, sensory exam grossly normal, mentation intact and speech normal     PSYCH: affect flat, fatigued and inattentive     LYMPHATICS: No cervical adenopathy    DIAGNOSTICS:     EKG: appears normal, NSR, normal axis, normal intervals, no acute ST/T changes c/w ischemia, no LVH by voltage criteria, unchanged from previous tracings  Labs Resulted Today:   Results for orders placed or performed during the hospital encounter of 02/14/19   US Abdomen Limited (RUQ)    Narrative    RIGHT UPPER QUADRANT ULTRASOUND 2/14/2019 1:00 PM    HISTORY: Right-sided abdominal pain and back pain, history of  gallstones noted on CT scan last year.    COMPARISON: CT 7/20/2018    FINDINGS:     Gallbladder: There is a large shadowing gallstone and several smaller  stones present. No gallbladder wall thickening or pericholecystic  fluid. No tenderness with direct transducer pressure over the  gallbladder.    Bile ducts: CHD is normal diameter. No intrahepatic biliary  dilatation.    Liver: Normal.     Pancreas: Normal.     Right kidney: Normal.       Impression    IMPRESSION: Cholelithiasis without evidence of acute cholecystitis.    WILLEM MONTERROSO MD   CBC with platelets differential   Result Value Ref [facility]    WBC 7.0 4.0 - 11.0 10e9/L    RBC Count 4.80 3.8 - 5.2 10e12/L    Hemoglobin 14.5 11.7 - 15.7 g/dL    Hematocrit 43.3 35.0 - 47.0 %    MCV 90 78 - 100 fl    MCH 30.2 26.5 - 33.0 pg    MCHC 33.5 31.5 - 36.5 g/dL    RDW 13.7 10.0 - 15.0 %    Platelet Count 156 150 - 450 10e9/L    Diff Method Automated Method     % Neutrophils 62.5 %    % Lymphocytes 24.5 %    % Monocytes 8.6 %    % Eosinophils 3.5 %    % Basophils 0.6 %    % Immature Granulocytes 0.3 %    Nucleated RBCs 0 0 /100    Absolute Neutrophil 4.4 1.6 - 8.3 10e9/L    Absolute Lymphocytes 1.7 0.8 - 5.3 10e9/L    Absolute Monocytes 0.6 0.0 - 1.3 10e9/L    Absolute Basophils 0.0 0.0 - 0.2 10e9/L    Abs Immature Granulocytes 0.0 0 - 0.4 10e9/L    Absolute Nucleated RBC 0.0    Basic metabolic panel   Result Value Ref [facility]    Sodium 138 133 - 144 mmol/L    Potassium 3.9 3.4 - 5.3 mmol/L    Chloride 105 94 - 109 mmol/L    Carbon Dioxide 27 20 - 32 mmol/L    Anion Gap 6 3 - 14 mmol/L    Glucose 99 70 - 99 mg/dL    Urea Nitrogen 15 7 - 30 mg/dL    Creatinine 0.97 0.52 - 1.04 mg/dL    GFR Estimate 64 >60 mL/min/[1.73_m2]    GFR Estimate If Black 75 >60 mL/min/[1.73_m2]    Calcium 9.1 8.5 - 10.1 mg/dL   Lipase   Result Value Ref [facility]    Lipase 126 73 - 393 U/L   Hepatic panel   Result Value Ref [facility]    Bilirubin Direct 0.1 0.0 - 0.2 mg/dL    Bilirubin Total 0.5 0.2 - 1.3 mg/dL    Albumin 3.8 3.4 - 5.0 g/dL    Protein Total 7.6 6.8 - 8.8 g/dL     Alkaline Phosphatase 79 40 - 150 U/L    ALT 91 (H) 0 - 50 U/L    AST 48 (H) 0 - 45 U/L   Troponin I   Result Value Ref [facility]    Troponin I ES <0.015 0.000 - 0.045 ug/L   UA reflex to Microscopic and Culture   Result Value Ref [facility]    Color Urine Yellow     Appearance Urine Clear     Glucose Urine Negative NEG^Negative mg/dL    Bilirubin Urine Negative NEG^Negative    Ketones Urine Negative NEG^Negative mg/dL    Specific Gravity Urine 1.016 1.003 - 1.035    Blood Urine Negative NEG^Negative    pH Urine 5.0 5.0 - 7.0 pH    Protein Albumin Urine Negative NEG^Negative mg/dL    Urobilinogen mg/dL 0.0 0.0 - 2.0 mg/dL    Nitrite Urine Negative NEG^Negative    Leukocyte Esterase Urine Negative NEG^Negative    Source Midstream Urine      Labs Drawn and in Process:   Unresulted Labs Ordered in the Past 30 Days of this Admission     No orders found from 12/24/2018 to 2/23/2019.          Recent Labs   Lab Test 02/14/19  1220 07/10/18  2240 12/23/17  2155   HGB 14.5 14.7 14.0    111* 180   INR  --   --  0.88    137 136   POTASSIUM 3.9 3.9 3.5   CR 0.97 0.91 0.94        IMPRESSION:   Reason for surgery/procedure: Surgical intervention related to gallbladder disease  Diagnosis/reason for consult: Anesthesia/surgical clearance.    The proposed surgical procedure is considered INTERMEDIATE risk.    REVISED CARDIAC RISK INDEX  The patient has the following serious cardiovascular risks for perioperative complications such as (MI, PE, VFib and 3  AV Block):  No serious cardiac risks  INTERPRETATION: 1 risks: Class II (low risk - 0.9% complication rate)    The patient has the following additional risks for perioperative complications:  No identified additional risks  The ASCVD Risk score (Sarah VANDANA Gold., et al., 2013) failed to calculate for the following reasons:    Cannot find a previous HDL lab    Cannot find a previous total cholesterol lab      ICD-10-CM    1. Preop general physical exam Z01.818 EKG 12-lead complete  w/read - Clinics     Comprehensive metabolic panel     Amylase     Lipase     *UA reflex to Microscopic and Culture ([facility] (except [place] and [place])   2. Calculus of gallbladder without cholecystitis without obstruction K80.20 EKG 12-lead complete w/read - Clinics     Comprehensive metabolic panel     Amylase     Lipase   3. Other specified hypothyroidism E03.8 EKG 12-lead complete w/read - Clinics     Comprehensive metabolic panel   4. Tobacco abuse Z72.0 EKG 12-lead complete w/read - Clinics     Comprehensive metabolic panel   5. Hyperlipidemia LDL goal <130 E78.5 EKG 12-lead complete w/read - Clinics     Comprehensive metabolic panel     LDL cholesterol direct   6. Abnormal LFTs R94.5 Amylase     Lipase   7. Family history of colon cancer Z80.0    8. Marijuana use, episodic F12.90        RECOMMENDATIONS:       Obstructive Sleep Apnea (or suspected sleep apnea)  Hospital staff are advised to monitor for sleep related oxygen desaturations due to question of JULIANA      --Patient is to take all scheduled medications on the day of surgery EXCEPT for modifications listed below.    APPROVAL GIVEN to proceed with proposed procedure, without further diagnostic evaluation       Signed Electronically by: Jacinto Lanier PA-C    Copy of this evaluation report is provided to requesting physician.    [facility] Pre[facility] Guidelines    Revised Cardiac Risk Index

## 2019-02-23 LAB — AMYLASE SERPL-CCNC: 55 U/L (ref 30–110)

## 2019-02-24 ENCOUNTER — ANESTHESIA EVENT (OUTPATIENT)
Dept: SURGERY | Facility: CLINIC | Age: 59
End: 2019-02-24
Payer: COMMERCIAL

## 2019-02-24 ASSESSMENT — LIFESTYLE VARIABLES: TOBACCO_USE: 1

## 2019-02-25 ENCOUNTER — ANESTHESIA (OUTPATIENT)
Dept: SURGERY | Facility: CLINIC | Age: 59
End: 2019-02-25
Payer: COMMERCIAL

## 2019-02-25 ENCOUNTER — HOSPITAL ENCOUNTER (OUTPATIENT)
Facility: CLINIC | Age: 59
Discharge: HOME | End: 2019-02-25
Attending: SURGERY | Admitting: SURGERY
Payer: COMMERCIAL

## 2019-02-25 VITALS
HEART RATE: 56 BPM | RESPIRATION RATE: 8 BRPM | TEMPERATURE: 97.6 F | SYSTOLIC BLOOD PRESSURE: 124 MMHG | DIASTOLIC BLOOD PRESSURE: 71 MMHG | OXYGEN SATURATION: 95 %

## 2019-02-25 DIAGNOSIS — Z90.49 S/P LAPAROSCOPIC CHOLECYSTECTOMY: Primary | ICD-10-CM

## 2019-02-25 PROCEDURE — 71000014 ZZH RECOVERY PHASE 1 LEVEL 2 FIRST HR: Performed by: SURGERY

## 2019-02-25 PROCEDURE — 37000009 ZZH ANESTHESIA TECHNICAL FEE, EACH ADDTL 15 MIN: Performed by: SURGERY

## 2019-02-25 PROCEDURE — 25000128 H RX IP 250 OP 636: Performed by: SURGERY

## 2019-02-25 PROCEDURE — 25000125 ZZHC RX 250: Performed by: NURSE ANESTHETIST, CERTIFIED REGISTERED

## 2019-02-25 PROCEDURE — 25000566 ZZH SEVOFLURANE, EA 15 MIN: Performed by: SURGERY

## 2019-02-25 PROCEDURE — 37000008 ZZH ANESTHESIA TECHNICAL FEE, 1ST 30 MIN: Performed by: SURGERY

## 2019-02-25 PROCEDURE — 25000128 H RX IP 250 OP 636: Performed by: NURSE ANESTHETIST, CERTIFIED REGISTERED

## 2019-02-25 PROCEDURE — 47562 LAPAROSCOPIC CHOLECYSTECTOMY: CPT | Performed by: SURGERY

## 2019-02-25 PROCEDURE — 36000056 ZZH SURGERY LEVEL 3 1ST 30 MIN: Performed by: SURGERY

## 2019-02-25 PROCEDURE — 25000132 ZZH RX MED GY IP 250 OP 250 PS 637: Performed by: NURSE ANESTHETIST, CERTIFIED REGISTERED

## 2019-02-25 PROCEDURE — 25000125 ZZHC RX 250: Performed by: SURGERY

## 2019-02-25 PROCEDURE — 27210794 ZZH OR GENERAL SUPPLY STERILE: Performed by: SURGERY

## 2019-02-25 PROCEDURE — 36000058 ZZH SURGERY LEVEL 3 EA 15 ADDTL MIN: Performed by: SURGERY

## 2019-02-25 PROCEDURE — 71000027 ZZH RECOVERY PHASE 2 EACH 15 MINS: Performed by: SURGERY

## 2019-02-25 PROCEDURE — 25800030 ZZH RX IP 258 OP 636: Performed by: NURSE ANESTHETIST, CERTIFIED REGISTERED

## 2019-02-25 PROCEDURE — 88304 TISSUE EXAM BY PATHOLOGIST: CPT | Performed by: SURGERY

## 2019-02-25 PROCEDURE — 40000306 ZZH STATISTIC PRE PROC ASSESS II: Performed by: SURGERY

## 2019-02-25 PROCEDURE — 88304 TISSUE EXAM BY PATHOLOGIST: CPT | Mod: 26 | Performed by: SURGERY

## 2019-02-25 RX ORDER — KETAMINE HYDROCHLORIDE 10 MG/ML
INJECTION, SOLUTION INTRAMUSCULAR; INTRAVENOUS PRN
Status: DISCONTINUED | OUTPATIENT
Start: 2019-02-25 | End: 2019-02-25

## 2019-02-25 RX ORDER — GLYCOPYRROLATE 0.2 MG/ML
INJECTION, SOLUTION INTRAMUSCULAR; INTRAVENOUS PRN
Status: DISCONTINUED | OUTPATIENT
Start: 2019-02-25 | End: 2019-02-25

## 2019-02-25 RX ORDER — ONDANSETRON 2 MG/ML
4 INJECTION INTRAMUSCULAR; INTRAVENOUS EVERY 30 MIN PRN
Status: DISCONTINUED | OUTPATIENT
Start: 2019-02-25 | End: 2019-02-25 | Stop reason: HOSPADM

## 2019-02-25 RX ORDER — EPHEDRINE SULFATE 50 MG/ML
INJECTION, SOLUTION INTRAMUSCULAR; INTRAVENOUS; SUBCUTANEOUS PRN
Status: DISCONTINUED | OUTPATIENT
Start: 2019-02-25 | End: 2019-02-25

## 2019-02-25 RX ORDER — NALOXONE HYDROCHLORIDE 0.4 MG/ML
.1-.4 INJECTION, SOLUTION INTRAMUSCULAR; INTRAVENOUS; SUBCUTANEOUS
Status: DISCONTINUED | OUTPATIENT
Start: 2019-02-25 | End: 2019-02-25 | Stop reason: HOSPADM

## 2019-02-25 RX ORDER — SODIUM CHLORIDE, SODIUM LACTATE, POTASSIUM CHLORIDE, CALCIUM CHLORIDE 600; 310; 30; 20 MG/100ML; MG/100ML; MG/100ML; MG/100ML
INJECTION, SOLUTION INTRAVENOUS CONTINUOUS
Status: DISCONTINUED | OUTPATIENT
Start: 2019-02-25 | End: 2019-02-25 | Stop reason: HOSPADM

## 2019-02-25 RX ORDER — ONDANSETRON 4 MG/1
4 TABLET, ORALLY DISINTEGRATING ORAL EVERY 30 MIN PRN
Status: DISCONTINUED | OUTPATIENT
Start: 2019-02-25 | End: 2019-02-25 | Stop reason: HOSPADM

## 2019-02-25 RX ORDER — ONDANSETRON 2 MG/ML
INJECTION INTRAMUSCULAR; INTRAVENOUS PRN
Status: DISCONTINUED | OUTPATIENT
Start: 2019-02-25 | End: 2019-02-25

## 2019-02-25 RX ORDER — MEPERIDINE HYDROCHLORIDE 25 MG/ML
12.5 INJECTION INTRAMUSCULAR; INTRAVENOUS; SUBCUTANEOUS
Status: DISCONTINUED | OUTPATIENT
Start: 2019-02-25 | End: 2019-02-25 | Stop reason: HOSPADM

## 2019-02-25 RX ORDER — PROPOFOL 10 MG/ML
INJECTION, EMULSION INTRAVENOUS PRN
Status: DISCONTINUED | OUTPATIENT
Start: 2019-02-25 | End: 2019-02-25

## 2019-02-25 RX ORDER — GABAPENTIN 300 MG/1
300 CAPSULE ORAL ONCE
Status: COMPLETED | OUTPATIENT
Start: 2019-02-25 | End: 2019-02-25

## 2019-02-25 RX ORDER — DIMENHYDRINATE 50 MG/ML
25 INJECTION, SOLUTION INTRAMUSCULAR; INTRAVENOUS
Status: DISCONTINUED | OUTPATIENT
Start: 2019-02-25 | End: 2019-02-25 | Stop reason: HOSPADM

## 2019-02-25 RX ORDER — SCOLOPAMINE TRANSDERMAL SYSTEM 1 MG/1
1 PATCH, EXTENDED RELEASE TRANSDERMAL ONCE
Status: COMPLETED | OUTPATIENT
Start: 2019-02-25 | End: 2019-02-25

## 2019-02-25 RX ORDER — CEFAZOLIN SODIUM 1 G/3ML
1 INJECTION, POWDER, FOR SOLUTION INTRAMUSCULAR; INTRAVENOUS SEE ADMIN INSTRUCTIONS
Status: DISCONTINUED | OUTPATIENT
Start: 2019-02-25 | End: 2019-02-25 | Stop reason: HOSPADM

## 2019-02-25 RX ORDER — HYDROMORPHONE HYDROCHLORIDE 1 MG/ML
.3-.5 INJECTION, SOLUTION INTRAMUSCULAR; INTRAVENOUS; SUBCUTANEOUS EVERY 10 MIN PRN
Status: DISCONTINUED | OUTPATIENT
Start: 2019-02-25 | End: 2019-02-25 | Stop reason: HOSPADM

## 2019-02-25 RX ORDER — OXYCODONE HYDROCHLORIDE 5 MG/1
5-10 TABLET ORAL EVERY 4 HOURS PRN
Status: DISCONTINUED | OUTPATIENT
Start: 2019-02-25 | End: 2019-02-25 | Stop reason: HOSPADM

## 2019-02-25 RX ORDER — OXYCODONE AND ACETAMINOPHEN 5; 325 MG/1; MG/1
1 TABLET ORAL
Status: DISCONTINUED | OUTPATIENT
Start: 2019-02-25 | End: 2019-02-25

## 2019-02-25 RX ORDER — ONDANSETRON 4 MG/1
4 TABLET, ORALLY DISINTEGRATING ORAL
Status: DISCONTINUED | OUTPATIENT
Start: 2019-02-25 | End: 2019-02-25 | Stop reason: HOSPADM

## 2019-02-25 RX ORDER — PROPOFOL 10 MG/ML
INJECTION, EMULSION INTRAVENOUS CONTINUOUS PRN
Status: DISCONTINUED | OUTPATIENT
Start: 2019-02-25 | End: 2019-02-25

## 2019-02-25 RX ORDER — LIDOCAINE 40 MG/G
CREAM TOPICAL
Status: DISCONTINUED | OUTPATIENT
Start: 2019-02-25 | End: 2019-02-25 | Stop reason: HOSPADM

## 2019-02-25 RX ORDER — KETOROLAC TROMETHAMINE 30 MG/ML
INJECTION, SOLUTION INTRAMUSCULAR; INTRAVENOUS PRN
Status: DISCONTINUED | OUTPATIENT
Start: 2019-02-25 | End: 2019-02-25

## 2019-02-25 RX ORDER — OXYCODONE HYDROCHLORIDE 5 MG/1
5-10 TABLET ORAL EVERY 4 HOURS PRN
Status: DISCONTINUED | OUTPATIENT
Start: 2019-02-25 | End: 2019-02-25

## 2019-02-25 RX ORDER — ACETAMINOPHEN 325 MG/1
975 TABLET ORAL ONCE
Status: COMPLETED | OUTPATIENT
Start: 2019-02-25 | End: 2019-02-25

## 2019-02-25 RX ORDER — BUPIVACAINE HYDROCHLORIDE AND EPINEPHRINE 5; 5 MG/ML; UG/ML
30 INJECTION, SOLUTION PERINEURAL ONCE
Status: COMPLETED | OUTPATIENT
Start: 2019-02-25 | End: 2019-02-25

## 2019-02-25 RX ORDER — LIDOCAINE HYDROCHLORIDE 20 MG/ML
INJECTION, SOLUTION INFILTRATION; PERINEURAL PRN
Status: DISCONTINUED | OUTPATIENT
Start: 2019-02-25 | End: 2019-02-25

## 2019-02-25 RX ORDER — CEFAZOLIN SODIUM 2 G/100ML
2 INJECTION, SOLUTION INTRAVENOUS
Status: COMPLETED | OUTPATIENT
Start: 2019-02-25 | End: 2019-02-25

## 2019-02-25 RX ORDER — OXYCODONE AND ACETAMINOPHEN 5; 325 MG/1; MG/1
1-2 TABLET ORAL EVERY 4 HOURS PRN
Qty: 16 TABLET | Refills: 0 | Status: SHIPPED | OUTPATIENT
Start: 2019-02-25 | End: 2019-02-28

## 2019-02-25 RX ORDER — ONDANSETRON 4 MG/1
4-8 TABLET, ORALLY DISINTEGRATING ORAL EVERY 8 HOURS PRN
Qty: 10 TABLET | Refills: 0 | Status: SHIPPED | OUTPATIENT
Start: 2019-02-25 | End: 2019-03-04

## 2019-02-25 RX ADMIN — GLYCOPYRROLATE 0.1 MG: 0.2 INJECTION, SOLUTION INTRAMUSCULAR; INTRAVENOUS at 15:05

## 2019-02-25 RX ADMIN — GABAPENTIN 300 MG: 300 CAPSULE ORAL at 13:46

## 2019-02-25 RX ADMIN — ROCURONIUM BROMIDE 50 MG: 10 INJECTION INTRAVENOUS at 14:49

## 2019-02-25 RX ADMIN — ONDANSETRON 4 MG: 2 INJECTION INTRAMUSCULAR; INTRAVENOUS at 15:20

## 2019-02-25 RX ADMIN — LIDOCAINE HYDROCHLORIDE 1 ML: 10 INJECTION, SOLUTION EPIDURAL; INFILTRATION; INTRACAUDAL; PERINEURAL at 14:26

## 2019-02-25 RX ADMIN — SCOPALAMINE 1 PATCH: 1 PATCH, EXTENDED RELEASE TRANSDERMAL at 13:44

## 2019-02-25 RX ADMIN — LIDOCAINE HYDROCHLORIDE 60 MG: 20 INJECTION, SOLUTION INFILTRATION; PERINEURAL at 14:48

## 2019-02-25 RX ADMIN — Medication 0.5 MG: at 15:59

## 2019-02-25 RX ADMIN — ACETAMINOPHEN 975 MG: 325 TABLET ORAL at 13:46

## 2019-02-25 RX ADMIN — KETAMINE HYDROCHLORIDE 50 MG: 10 INJECTION, SOLUTION INTRAMUSCULAR; INTRAVENOUS at 14:47

## 2019-02-25 RX ADMIN — KETOROLAC TROMETHAMINE 30 MG: 30 INJECTION, SOLUTION INTRAMUSCULAR at 15:29

## 2019-02-25 RX ADMIN — SODIUM CHLORIDE, POTASSIUM CHLORIDE, SODIUM LACTATE AND CALCIUM CHLORIDE: 600; 310; 30; 20 INJECTION, SOLUTION INTRAVENOUS at 15:30

## 2019-02-25 RX ADMIN — OXYCODONE HYDROCHLORIDE 10 MG: 5 TABLET ORAL at 16:30

## 2019-02-25 RX ADMIN — SODIUM CHLORIDE, POTASSIUM CHLORIDE, SODIUM LACTATE AND CALCIUM CHLORIDE: 600; 310; 30; 20 INJECTION, SOLUTION INTRAVENOUS at 14:26

## 2019-02-25 RX ADMIN — Medication 0.5 MG: at 16:34

## 2019-02-25 RX ADMIN — Medication 5 MG: at 15:10

## 2019-02-25 RX ADMIN — GLYCOPYRROLATE 0.1 MG: 0.2 INJECTION, SOLUTION INTRAMUSCULAR; INTRAVENOUS at 15:07

## 2019-02-25 RX ADMIN — GLYCOPYRROLATE 0.2 MG: 0.2 INJECTION, SOLUTION INTRAMUSCULAR; INTRAVENOUS at 14:44

## 2019-02-25 RX ADMIN — MIDAZOLAM 2 MG: 1 INJECTION INTRAMUSCULAR; INTRAVENOUS at 14:38

## 2019-02-25 RX ADMIN — PROPOFOL 170 MG: 10 INJECTION, EMULSION INTRAVENOUS at 14:48

## 2019-02-25 RX ADMIN — CEFAZOLIN SODIUM 2 G: 2 INJECTION, SOLUTION INTRAVENOUS at 14:57

## 2019-02-25 RX ADMIN — Medication 5 MG: at 15:08

## 2019-02-25 RX ADMIN — PROPOFOL 50 MCG/KG/MIN: 10 INJECTION, EMULSION INTRAVENOUS at 14:54

## 2019-02-25 RX ADMIN — MIDAZOLAM 1 MG: 1 INJECTION INTRAMUSCULAR; INTRAVENOUS at 16:07

## 2019-02-25 RX ADMIN — Medication 10 MCG: at 14:40

## 2019-02-25 NOTE — ANESTHESIA CARE TRANSFER NOTE
Patient: Amisha Lerma    Procedure(s):  LAPAROSCOPIC CHOLECYSTECTOMY    Diagnosis: symptomatic Cholelithiasis  Diagnosis Additional Information: No value filed.    Anesthesia Type:   General, ETT     Note:  Airway :Nasal Cannula  Patient transferred to:PACU  Handoff Report: Identifed the Patient, Identified the Reponsible Provider, Reviewed the pertinent medical history, Discussed the surgical course, Reviewed Intra-OP anesthesia mangement and issues during anesthesia, Set expectations for post-procedure period and Allowed opportunity for questions and acknowledgement of understanding      Vitals: (Last set prior to Anesthesia Care Transfer)    CRNA VITALS  2/25/2019 1517 - 2/25/2019 1600      2/25/2019             Pulse:  84    SpO2:  99 %                Electronically Signed By: DOLORES Vang CRNA  February 25, 2019  4:00 PM

## 2019-02-25 NOTE — DISCHARGE INSTRUCTIONS
[facility]    Home Care Following Gallbladder Surgery    Dr. García      Care of the Incision:     surgical glue was used on your incision, keep it dry for 24 hours.  Then you may shower but don t submerge under water for at least 2 weeks.  Gently pat your incision dry with a freshly laundered towel.    Do not touch your incision with bare hands or pick at scabs.    Leave your incision open to air.  Cover it only if draining, clothing rubs or irritates it.    Activity:    Gradually increase your activity.  Walk short distances several times each day and increase the distance as your strength allows.    To promote circulation, do not cross your legs while sitting.    Return to work will be determined by the type of work you do and should be discussed with your physician.    Do not drive or operate equipment while taking prescription pain medicines.  You may drive 1 week after surgery if you have stopped taking prescription pain medicines and can react quickly enough to make an emergency stop if necessary.    Diet:    Return to the diet you were on before surgery.    Drink plenty of water.    Avoid foods that cause constipation.      REMEMBER--most prescription pain pills cause constipation.  Walking, extra fluids, and increased fiber (fresh fruits and vegetables, etc.) are natural remedies for constipation.  You can also take mineral oil, 1-2 Tablespoons per day.  If still constipated you may try a stool softener such as Colace or Miralax.    Call Your Physician if You Have:    Redness, increased swelling or cloudy drainage from your incision.    A temperature of more than 101 degrees F.    Worsening pain in your incision not relieved by your prescription pain pills and/or a short rest.    Jaundice (yellowing of skin or eyes)    Abdominal distention (stomach getting very large)    Swelling in your legs    Productive cough    Burning with urination    Any questions or concerns about your recovery,  please call Business hours [phone]    After hours [phone] Nurse Advice Line (24 hours a day)    [facility]   Adult Discharge Orders & Instructions     For 24 hours after surgery    1. Get plenty of rest.  A responsible adult must stay with you for at least 24 hours after you leave the hospital.   2. Do not drive or use heavy equipment.  If you have weakness or tingling, don't drive or use heavy equipment until this feeling goes away.  3. Do not drink alcohol.  4. Avoid strenuous or risky activities.  Ask for help when climbing stairs.   5. You may feel lightheaded.  If so, sit for a few minutes before standing.  Have someone help you get up.   6. You may have a slight fever. Call the doctor if your fever is over 100 F (37.7 C) (taken under the tongue) or lasts longer than 24 hours.  7. You may have a dry mouth, a sore throat, muscle aches or trouble sleeping.  These should go away after 24 hours.  8. Do not make important or legal decisions.  We don t expect you to have any problems from the surgery or treatment you had today. Just in case, here s what to do if you have pain, upset stomach (nausea), bleeding or infection:  Pain:  Take medicines your doctor has prescribed or over-the-counter medicine they have suggested. Resting and using ice packs can help, too. For surgery on an arm or leg, raise it on a pillow to ease swelling. Call your doctor if these methods don t work.  Copyright Yoss[employer], Licensed under [employer]  Upset stomach (nausea):  Take anti-nausea medicine approved by your doctor. Drink clear liquids like apple juice, ginger ale, broth or 7-Up. Be sure to drink enough fluids. Rest can help, too. Move to normal foods when you re ready. Bleeding:  In the first 24 hours, you may see a little blood on your dressing, about the size of a quarter. You don t need to worry about this much blood, but if the blood spot keeps getting bigger:    Put pressure  on the wound if you can, AND    Call your doctor.  Copyright [employer], Licensed under [employer]  Fever/Infection: Please call your doctor if you have any of these signs:    Redness    Swelling    Wound feels warm    Pain gets worse    Bad-smelling fluid leaks from wound    Fever or chills  Call your doctor for any of the followin.  It has been over 8 to 10 hours since surgery and you are still not able to urinate (pass water).    2.  Headache for over 24 hours.    Nurse advice line: [phone]

## 2019-02-25 NOTE — BRIEF OP NOTE
[facility] Brief Operative Note    Pre-operative diagnosis: symptomatic Cholelithiasis   Post-operative diagnosis symptomatic cholelithiasis     Procedure: Procedure(s):  LAPAROSCOPIC CHOLECYSTECTOMY   Surgeon(s): Surgeon(s) and Role:     * Antoine García,  - Primary   Estimated blood loss: * No values recorded between 2/25/2019  3:03 PM and 2/25/2019  3:47 PM *    Specimens: ID Type Source Tests Collected by Time Destination   A : Gallbladder and Contents Tissue Gallbladder and Contents SURGICAL PATHOLOGY EXAM Antoine García DO 2/25/2019  3:11 PM       Findings: See dictation

## 2019-02-25 NOTE — ANESTHESIA PREPROCEDURE EVALUATION
Anesthesia Pre-Procedure Evaluation    Patient: Amisha Lerma   MRN: [id number] : 1960          Preoperative Diagnosis: symptomatic Cholelithiasis    Procedure(s):  LAPAROSCOPIC CHOLECYSTECTOMY, possible open    Past Medical History:   Diagnosis Date     Anxiety      Cholelithiasis 10/1/2013     Depressive disorder      Dysmenorrhea      Menometrorrhagia      Splenomegaly 10/1/2013     Substance abuse (H)      Thyroid disease      Past Surgical History:   Procedure Laterality Date     ABDOMEN SURGERY       GYN SURGERY      ablation     LAPAROSCOPIC APPENDECTOMY  2012    Procedure: LAPAROSCOPIC APPENDECTOMY;  laparoscopic appendectomy, abscess drainage, lysis of adhesions;  Surgeon: Jeffrey Adair MD;  Location: [facility]     LAPAROSCOPIC LYSIS ADHESIONS  2012    Procedure: LAPAROSCOPIC LYSIS ADHESIONS;;  Surgeon: Jeffrey Adair MD;  Location:  OR     ORTHOPEDIC SURGERY         Anesthesia Evaluation     . Pt has had prior anesthetic. Type: General    History of anesthetic complications   - PONV        ROS/MED HX    ENT/Pulmonary: Comment: Uses marijuana     (+)JULIANA risk factors snores loudly, tobacco use, Current use , . .    Neurologic:  - neg neurologic ROS     Cardiovascular:     (+) Dyslipidemia, ----. : . . . :. . Previous cardiac testing date:results:date: results:ECG reviewed date:19 results:SB date: results:          METS/Exercise Tolerance:     Hematologic:  - neg hematologic  ROS       Musculoskeletal:  - neg musculoskeletal ROS       GI/Hepatic:  - neg GI/hepatic ROS       Renal/Genitourinary:  - ROS Renal section negative       Endo:     (+) thyroid problem hypothyroidism, .      Psychiatric:     (+) psychiatric history depression and anxiety      Infectious Disease:  - neg infectious disease ROS       Malignancy:      - no malignancy   Other:                          Physical Exam  Normal systems: cardiovascular and pulmonary    Airway   Mallampati: II  TM distance:  ">3 FB  Neck ROM: full    Dental   (+) missing and chipped    Cardiovascular   Rhythm and rate: regular and normal      Pulmonary    breath sounds clear to auscultation    Other findings: Upper right tooth chipped         Lab Results   Component Value Date    WBC 7.0 02/14/2019    HGB 14.5 02/14/2019    HCT 43.3 02/14/2019     02/14/2019    CRP 3.1 12/23/2017    SED 8 01/02/2015     02/22/2019    POTASSIUM 4.2 02/22/2019    CHLORIDE 102 02/22/2019    CO2 28 02/22/2019    BUN 15 02/22/2019    CR 0.99 02/22/2019    GLC 85 02/22/2019    CAITY 9.5 02/22/2019    ALBUMIN 3.7 02/22/2019    PROTTOTAL 7.6 02/22/2019     (H) 02/22/2019     (H) 02/22/2019    ALKPHOS 86 02/22/2019    BILITOTAL 0.5 02/22/2019    LIPASE 170 02/22/2019    AMYLASE 55 02/22/2019    PTT 40 (H) 12/23/2017    INR 0.88 12/23/2017    TSH 2.41 09/28/2013       Preop Vitals  BP Readings from Last 3 Encounters:   02/22/19 120/78   02/15/19 120/78   02/14/19 147/86    Pulse Readings from Last 3 Encounters:   02/22/19 56   02/14/19 53   07/19/18 85      Resp Readings from Last 3 Encounters:   02/22/19 16   02/14/19 20   07/11/18 20    SpO2 Readings from Last 3 Encounters:   02/22/19 95%   02/14/19 98%   07/19/18 97%      Temp Readings from Last 1 Encounters:   02/22/19 97.5  F (36.4  C) (Temporal)    Ht Readings from Last 1 Encounters:   02/15/19 1.715 m (5' 7.5\")      Wt Readings from Last 1 Encounters:   02/22/19 90.5 kg (199 lb 8 oz)    Estimated body mass index is 30.78 kg/m  as calculated from the following:    Height as of 2/15/19: 1.715 m (5' 7.5\").    Weight as of 2/22/19: 90.5 kg (199 lb 8 oz).       Anesthesia Plan      History & Physical Review  History and physical reviewed and following examination; no interval change.    ASA Status:  2 .    NPO Status:  > 8 hours    Plan for General and ETT with Intravenous and Propofol induction. Maintenance will be Balanced.    PONV prophylaxis:  Ondansetron (or other 5HT-3), " Dexamethasone or Solumedrol and Scopolamine patch       Postoperative Care  Postoperative pain management:  IV analgesics and Oral pain medications.      Consents  Anesthetic plan, risks, benefits and alternatives discussed with:  Patient.  Use of blood products discussed: No .   .                 DOLORES Vang CRNA

## 2019-02-26 NOTE — ANESTHESIA POSTPROCEDURE EVALUATION
Patient: Amisha Lerma    Procedure(s):  LAPAROSCOPIC CHOLECYSTECTOMY    Diagnosis:symptomatic Cholelithiasis  Diagnosis Additional Information: No value filed.    Anesthesia Type:  General, ETT    Note:  Anesthesia Post Evaluation    Patient location during evaluation: PACU  Patient participation: Able to fully participate in evaluation  Level of consciousness: awake  Pain management: adequate  Airway patency: patent  Cardiovascular status: acceptable and hemodynamically stable  Respiratory status: acceptable, room air and nonlabored ventilation  Hydration status: stable  PONV: none     Anesthetic complications: None    Comments: Patient was happy with the anesthesia care received and no anesthesia related complications were noted.  I will follow up with the patient again if it is needed.        Last vitals:  Vitals:    02/25/19 1715 02/25/19 1725 02/25/19 1730   BP: 124/70  124/71   Pulse: 58  56   Resp:      Temp:      SpO2: 96% 95%          Electronically Signed By: DOLORES Vang CRNA  February 26, 2019  3:04 PM

## 2019-02-26 NOTE — OP NOTE
Procedure Date: 02/25/2019      PROCEDURE PERFORMED:   Laparoscopic cholecystectomy.         PREOPERATIVE DIAGNOSIS:  Symptomatic cholelithiasis.         POSTOPERATIVE DIAGNOSIS:  Symptomatic with cholelithiasis.         SURGEON:  Antoine García DO         ASSISTANT:  None.         ANESTHESIA:  General endotracheal anesthesia.         ESTIMATED BLOOD LOSS:  Minimal.         COMPLICATIONS:  None immediately apparent.          SPECIMENS:  Gallbladder and contents.         INDICATIONS FOR PROCEDURE:  Amisha is a 58-year-old female with a several month history of right-sided abdominal pain.  She previously had imaging over the last couple of years which showed large gallstones but at the time, her abdominal discomfort was not attributed to her gallbladder.  She does not actually have the associated postprandial pain, but she does say that she does get full early and has had some weight loss recently because of food aversion.  She was in the ER again recently for complaints of right-sided abdominal pain and flank pain.  Ultrasound showed again the large gallstones.  There was no other abnormal laboratory evaluation.  We discussed laparoscopic cholecystectomy for symptomatic relief of her biliary disease.  I described the procedure in detail, risks, benefits, alternatives, postop care and restrictions and after informed discussion, agreed to proceed with laparoscopic cholecystectomy.      DESCRIPTION OF PROCEDURE:  After the informed consent was obtained, the patient was brought from the preoperative holding area to the operating room, placed in the supine position.  Anesthesia was induced.  She was prepped and draped in normal sterile fashion.  Timeout was performed.  After the correct patient and correct procedure was verified, we began by making a supraumbilical 5 mm incision.  A Veress needle was inserted into the peritoneal cavity, and the abdomen was insufflated to 15 mmHg.  A 5 mm trocar was inserted.  Camera  was inserted and a general survey of the abdomen revealed no gross abnormalities.  Two additional 5 mm trocars in the right subcostal margin were placed under direct visualization and an 11 mm trocar was placed in the subxiphoid area under direct visualization.  The patient was placed in head up rotated left position.  Gallbladder was retracted and elevated.  While grasping Kathryn's pouch, using a curved [place] dissector, I was able to peel away the peritoneum from Kathryn's pouch and using the dissector, was able to circumferentially dissect around the cystic duct.  I brought the dissection posteriorly towards the cystic plate and encountered the cystic artery which was again encircled using the dissector.  The dissection was brought even more posteriorly until I reached the liver.  Then I cleared the path from the cystic duct all the way up to the cystic plate.  I saw 2 structures going into the gallbladder, namely the cystic duct and cystic artery; therefore, I achieved the critical view of safety.  Using a 10 mm clip applier, I clipped the cystic duct on the stay side x2 and the specimen side x1, and repeated this process with the cystic artery.  Using EndoShears, I transected both the cystic duct and cystic artery.  Using the hook electrocautery, I then removed the gallbladder from the liver bed.  When this was done, there was no bleeding or bile staining in the operative field.  The gallbladder was placed in an Endo Catch bag and brought through the 11 mm incision.  I had to elongate the incision a little bit because the gallstones were so large.  I then closed the 11 mm fascial defect with #0 Vicryl suture using PMI closure device.  The abdomen was then desufflated.  All the ports were removed under direct visualization.  Skin was instilled with 0.25% Marcaine with epinephrine for local anesthesia.  Skin was closed with inverted interrupted 4-0 Monocryl sutures and a Dermabond dressing was applied.         At the completion of the case, all instruments, needles and sponges were accounted for, after a correct count.  The patient was then awoken from anesthesia, brought to the recovery room in stable condition.         SUSAN POMPA DO             D: 2019   T: 2019   MT: LAURA      Name:     KODAK NEWTON   MRN:      [id number]        Account:        [id number]   :      1960           Procedure Date: 2019      Document: [id number]

## 2019-02-27 LAB — COPATH REPORT: NORMAL

## 2019-03-02 ENCOUNTER — HOSPITAL ENCOUNTER (EMERGENCY)
Facility: CLINIC | Age: 59
Discharge: HOME | End: 2019-03-02
Attending: FAMILY MEDICINE | Admitting: FAMILY MEDICINE
Payer: COMMERCIAL

## 2019-03-02 VITALS
RESPIRATION RATE: 16 BRPM | DIASTOLIC BLOOD PRESSURE: 72 MMHG | TEMPERATURE: 98.1 F | SYSTOLIC BLOOD PRESSURE: 134 MMHG | BODY MASS INDEX: 31.09 KG/M2 | WEIGHT: 201.5 LBS | OXYGEN SATURATION: 98 %

## 2019-03-02 DIAGNOSIS — B37.0 ORAL THRUSH: ICD-10-CM

## 2019-03-02 LAB
DEPRECATED S PYO AG THROAT QL EIA: NORMAL
SPECIMEN SOURCE: NORMAL

## 2019-03-02 PROCEDURE — 87880 STREP A ASSAY W/OPTIC: CPT | Performed by: FAMILY MEDICINE

## 2019-03-02 PROCEDURE — 99283 EMERGENCY DEPT VISIT LOW MDM: CPT | Performed by: FAMILY MEDICINE

## 2019-03-02 PROCEDURE — 87081 CULTURE SCREEN ONLY: CPT | Performed by: FAMILY MEDICINE

## 2019-03-02 PROCEDURE — 99284 EMERGENCY DEPT VISIT MOD MDM: CPT | Mod: Z6 | Performed by: FAMILY MEDICINE

## 2019-03-02 RX ORDER — NYSTATIN 100000/ML
500000 SUSPENSION, ORAL (FINAL DOSE FORM) ORAL 4 TIMES DAILY
Qty: 200 ML | Refills: 0 | Status: SHIPPED | OUTPATIENT
Start: 2019-03-02 | End: 2019-03-12

## 2019-03-02 ASSESSMENT — ENCOUNTER SYMPTOMS
FEVER: 0
CHILLS: 0

## 2019-03-02 NOTE — ED TRIAGE NOTES
States had a sore throat a earlier in the week, then developed soreness on the roof of her mouth and tongue.

## 2019-03-02 NOTE — ED AVS SNAPSHOT
[facility] Emergency Department  [address] [place] [address]  Phone:  [phone]  Fax:  [phone]                                    Amisha Lerma   MRN: [id number]    Department:  [facility] Emergency Department   Date of Visit:  3/2/2019           After Visit Summary Signature Page    I have received my discharge instructions, and my questions have been answered. I have discussed any challenges I see with this plan with the nurse or doctor.    ..........................................................................................................................................  Patient/Patient Representative Signature      ..........................................................................................................................................  Patient Representative Print Name and Relationship to Patient    ..................................................               ................................................  Date                                   Time    ..........................................................................................................................................  Reviewed by Signature/Title    ...................................................              ..............................................  Date                                               Time          22EPIC Rev 08/18

## 2019-03-02 NOTE — ED PROVIDER NOTES
"  History     Chief Complaint   Patient presents with     Mouth Problem     HPI  Amisha Lerma is a 58 year old female who presents to the emergency room with a raw burning sensation to her mouth.  The patient was intubated on Monday when she underwent a laparoscopic cholecystectomy.  Her mouth was a little raw and irritated the next day as she expected from the intubation.  However her mouth has become increasingly red and there is white plaque on her tongue.  She has never had trouble with oral thrush before.  She is on no antibiotics at this time.  She does not recall if she received any during surgery.  She is prone to yeast infections vaginally.  She is having no trouble with this currently.  She has a history of herpes which is usually on her skin and she takes Valtrex for this.  She has not seen any lesions and is never had oral herpes.  The pain feels like her bra burning sensation and does not feel herpetic to the patient.  She has not been exposed to strep.    Allergies:  Allergies   Allergen Reactions     Dilaudid [Hydromorphone Hcl]      Makes her \"pukey\" after surgery     Fentanyl      Other reaction(s): Agitation, Intolerance-Can't Take     Nickel        Problem List:    Patient Active Problem List    Diagnosis Date Noted     Marijuana use, episodic 02/22/2019     Priority: Medium     Abnormal LFTs 02/22/2019     Priority: Medium     Hyperlipidemia LDL goal <130 02/22/2019     Priority: Medium     Atypical chest pain 02/23/2016     Priority: Medium     Colon polyps 02/07/2014     Priority: Medium     Cholelithiasis 10/01/2013     Priority: Medium     Splenomegaly 10/01/2013     Priority: Medium     Family history of ischemic heart disease 07/17/2012     Priority: Medium     Family history of colon cancer 07/17/2012     Priority: Medium     Family history of skin cancer 07/17/2012     Priority: Medium     Family history of high cholesterol 07/17/2012     Priority: Medium     Anxiety 06/15/2012     " Priority: Medium     Major depressive disorder, single episode, mild (H) 08/01/2008     Priority: Medium     Hypothyroidism 06/15/2012     Priority: Low     Major depression in complete remission (H) 06/15/2012     Priority: Low     Tobacco abuse 06/15/2012     Priority: Low        Past Medical History:    Past Medical History:   Diagnosis Date     Anxiety      Cholelithiasis 10/1/2013     Depressive disorder      Dysmenorrhea      Menometrorrhagia      PONV (postoperative nausea and vomiting)      Splenomegaly 10/1/2013     Substance abuse (H)      Thyroid disease        Past Surgical History:    Past Surgical History:   Procedure Laterality Date     ABDOMEN SURGERY       GYN SURGERY      ablation     LAPAROSCOPIC APPENDECTOMY  7/19/2012    Procedure: LAPAROSCOPIC APPENDECTOMY;  laparoscopic appendectomy, abscess drainage, lysis of adhesions;  Surgeon: Jeffrey Adair MD;  [facility]     LAPAROSCOPIC CHOLECYSTECTOMY N/A 2/25/2019    Procedure: LAPAROSCOPIC CHOLECYSTECTOMY;  Surgeon: Antoine García DO;  Location: [facility]     LAPAROSCOPIC LYSIS ADHESIONS  7/19/2012    Procedure: LAPAROSCOPIC LYSIS ADHESIONS;;  Surgeon: Jeffrey Adair MD;  Location: [facility] OR     ORTHOPEDIC SURGERY         Family History:    Family History   Problem Relation Age of Onset     Cancer Brother         lung and brain       Social History:  Marital Status:   [2]  Social History     Tobacco Use     Smoking status: Current Every Day Smoker     Packs/day: 1.00     Years: 30.00     Pack years: 30.00     Smokeless tobacco: Never Used   Substance Use Topics     Alcohol use: No     Comment: Alcohol free for 19 years     Drug use: No        Medications:      busPIRone HCl (BUSPAR) 30 MG tablet   escitalopram (LEXAPRO) 20 MG tablet   levothyroxine (SYNTHROID) 125 MCG tablet   lorazepam (ATIVAN) 1 MG tablet   nystatin (MYCOSTATIN) 546325 UNIT/ML suspension   OMEPRAZOLE PO   ondansetron (ZOFRAN ODT) 4 MG ODT tab    oxyCODONE-acetaminophen (PERCOCET) 5-325 MG tablet   PROAIR  (90 Base) MCG/ACT inhaler   valACYclovir (VALTREX) 1000 mg tablet   ondansetron (ZOFRAN-ODT) 4 MG ODT tab         Review of Systems   Constitutional: Negative for chills and fever.   All other systems reviewed and are negative.      Physical Exam   BP: 134/72  Heart Rate: 59  Temp: 98.1  F (36.7  C)  Resp: 18  Weight: 91.4 kg (201 lb 8 oz)  SpO2: 98 %      Physical Exam   Constitutional: She is oriented to person, place, and time. She appears well-developed and well-nourished.   HENT:   Head: Normocephalic and atraumatic.   Right Ear: External ear normal.   Left Ear: External ear normal.   Oropharynx is bright red particular the roof of her mouth and tongue and back.  There is some white plaque on her tongue suspicious for yeast.  There are no lesions or open sores anywhere.  There is no herpetic lesions.  Tissue is also very sensitive.   Eyes: Conjunctivae are normal.   Neck: Normal range of motion.   Cardiovascular: Normal rate.   Pulmonary/Chest: Effort normal.   Neurological: She is alert and oriented to person, place, and time.   Skin: Skin is warm. Capillary refill takes less than 2 seconds.   Psychiatric: She has a normal mood and affect.   Nursing note and vitals reviewed.      ED Course        Procedures               Critical Care time:  none               Results for orders placed or performed during the hospital encounter of 03/02/19 (from the past 24 hour(s))   Rapid strep screen   Result Value Ref Range    Specimen Description Throat     Rapid Strep A Screen       NEGATIVE: No Group A streptococcal antigen detected by immunoassay, await culture report.       Medications - No data to display    Assessments & Plan (with Medical Decision Making)   MDM--58-year-old female who is intubated on Monday for laparoscopic cholecystectomy.  She had just some mild throat irritation the day after but now presents with increasing raw burning pain,  redness and white plaque inside her mouth.  She does not recall receiving any antibiotics and is on none at this time.  She is prone to VA vaginal yeast infections but is currently asymptomatic.  She does have a history of shingles on her back but is never had any oral and there are no lesions present.  She does not feel the pain is consistent with herpes.  She is also seeing some increasing white plaque on her tongue when she brushes it.  The patient's mouth appears to have an oral thrush particularly the tongue refer the mouth and posterior pharynx.  I see no herpetic lesions but I did discuss the possibility.  I inset see any evidence of bacterial and there is no exposures and her rapid strep test was negative.  I discussed options with the patient and she would prefer to use a swish and spit and nystatin 100,000 units/mL was prescribed to be taken 5 mL's 4 times daily as a switch and spit.  Patient is comfortable with this evaluation treatment and discharge plan and she was advised to reevaluate if she is not improving or getting worse.      I have reviewed the nursing notes.    I have reviewed the findings, diagnosis, plan and need for follow up with the patient.             Medication List      Started    nystatin 869482 UNIT/ML suspension  Commonly known as:  MYCOSTATIN  500,000 Units, Swish & Spit, 4 TIMES DAILY        ASK your doctor about these medications    oxyCODONE-acetaminophen 5-325 MG tablet  Commonly known as:  PERCOCET  1-2 tablets, Oral, EVERY 4 HOURS PRN  Ask about: Should I take this medication?            Final diagnoses:   Oral thrush       3/2/2019   [facility] EMERGENCY DEPARTMENT     LeeroyScot MD  03/02/19 [id number]       Leeroy, Scot FOURNIER MD  03/02/19 0713

## 2019-03-04 LAB
BACTERIA SPEC CULT: NORMAL
SPECIMEN SOURCE: NORMAL

## 2019-03-14 ENCOUNTER — OFFICE VISIT (OUTPATIENT)
Dept: SURGERY | Facility: OTHER | Age: 59
End: 2019-03-14
Payer: COMMERCIAL

## 2019-03-14 VITALS
HEIGHT: 68 IN | DIASTOLIC BLOOD PRESSURE: 64 MMHG | WEIGHT: 195 LBS | SYSTOLIC BLOOD PRESSURE: 116 MMHG | BODY MASS INDEX: 29.55 KG/M2 | TEMPERATURE: 96.9 F

## 2019-03-14 DIAGNOSIS — Z90.49 S/P LAPAROSCOPIC CHOLECYSTECTOMY: Primary | ICD-10-CM

## 2019-03-14 PROCEDURE — 99024 POSTOP FOLLOW-UP VISIT: CPT | Performed by: SURGERY

## 2019-03-14 ASSESSMENT — MIFFLIN-ST. JEOR: SCORE: 1505.07

## 2019-03-14 NOTE — LETTER
"    3/14/2019         RE: Amisha Lerma  [address] [facility] [address]        Dear Colleague,    Thank you for referring your patient, Amisha Lerma, to the [facility]. Please see a copy of my visit note below.    General Surgery Follow Up    Pt returns for follow up visit s/p lap nina    HPI:  Doing pretty well. Pain improved, incisions healing well. She is still having some nausea and \"chills\". No jaundice or pale stools. She has never had colonoscopy.      Past Medical History:   Diagnosis Date     Anxiety      Cholelithiasis 10/1/2013     Depressive disorder      Dysmenorrhea      Menometrorrhagia      PONV (postoperative nausea and vomiting)      Splenomegaly 10/1/2013     Substance abuse (H)      Thyroid disease        Past Surgical History:   Procedure Laterality Date     ABDOMEN SURGERY       GYN SURGERY      ablation     LAPAROSCOPIC APPENDECTOMY  7/19/2012    Procedure: LAPAROSCOPIC APPENDECTOMY;  laparoscopic appendectomy, abscess drainage, lysis of adhesions;  Surgeon: Jeffrey Adair MD;  [facility]     LAPAROSCOPIC CHOLECYSTECTOMY N/A 2/25/2019    Procedure: LAPAROSCOPIC CHOLECYSTECTOMY;  Surgeon: Antoine García DO;  [facility]     LAPAROSCOPIC LYSIS ADHESIONS  7/19/2012    Procedure: LAPAROSCOPIC LYSIS ADHESIONS;;  Surgeon: Jeffrey Adair MD;  Location: [facility]     ORTHOPEDIC SURGERY         Social History     Socioeconomic History     Marital status:      Spouse name: Not on file     Number of children: Not on file     Years of education: Not on file     Highest education level: Not on file   Occupational History     Not on file   Social Needs     Financial resource strain: Not on file     Food insecurity:     Worry: Not on file     Inability: Not on file     Transportation needs:     Medical: Not on file     Non-medical: Not on file   Tobacco Use     Smoking status: Current Every Day Smoker     Packs/day: 1.00     Years: 30.00     " "Pack years: 30.00     Smokeless tobacco: Never Used   Substance and Sexual Activity     Alcohol use: No     Comment: Alcohol free for 19 years     Drug use: No     Sexual activity: Yes     Partners: Male     Birth control/protection: None   Lifestyle     Physical activity:     Days per week: Not on file     Minutes per session: Not on file     Stress: Not on file   Relationships     Social connections:     Talks on phone: Not on file     Gets together: Not on file     Attends Latter-day service: Not on file     Active member of club or organization: Not on file     Attends meetings of clubs or organizations: Not on file     Relationship status: Not on file     Intimate partner violence:     Fear of current or ex partner: Not on file     Emotionally abused: Not on file     Physically abused: Not on file     Forced sexual activity: Not on file   Other Topics Concern     Parent/sibling w/ CABG, MI or angioplasty before 65F 55M? Not Asked   Social History Narrative     Not on file       Current Outpatient Medications   Medication Sig Dispense Refill     busPIRone HCl (BUSPAR) 30 MG tablet   0     escitalopram (LEXAPRO) 20 MG tablet Take 20 mg by mouth daily.       levothyroxine (SYNTHROID) 125 MCG tablet Take 125 mcg by mouth daily.       lorazepam (ATIVAN) 1 MG tablet Take 0.5 mg by mouth 2 times daily as needed        OMEPRAZOLE PO Take 20 mg by mouth every other day       ondansetron (ZOFRAN ODT) 4 MG ODT tab Take 1 tablet (4 mg) by mouth every 8 hours as needed for nausea 10 tablet 0     oxyCODONE-acetaminophen (PERCOCET) 5-325 MG tablet Take 1-2 tablets by mouth every 4 hours as needed for pain 12 tablet 0     PROAIR  (90 Base) MCG/ACT inhaler INL 2 PFS PO Q 4 H PRN  0     valACYclovir (VALTREX) 1000 mg tablet Take 1 g by mouth         Medications and history reviewed    Physical exam:  Vitals: /64   Temp 96.9  F (36.1  C) (Temporal)   Ht 1.715 m (5' 7.5\")   Wt 88.5 kg (195 lb)   BMI 30.09 kg/m   "   BMI= Body mass index is 30.09 kg/m .    HEART: RRR, no new murmurs  LUNGS: CTAB, equal chest rise, good effort  ABD: soft, non tender, non distended  INCISIONS: c/d/i  EXT: BRIDGES, no deformities    PATHOLOGY:  Calculous chronic cholecystitis    Assessment:     ICD-10-CM    1. S/P laparoscopic cholecystectomy Z90.49      Plan: Path reviewed, questions answered. She should establish care with a PCP and if her problems persist, she might benefit from c-scope/EGD. She understands and will schedule an appt with a PCP.    Antoine García, DO      Again, thank you for allowing me to participate in the care of your patient.        Sincerely,        Antoine García, DO

## 2019-03-14 NOTE — PROGRESS NOTES
"General Surgery Follow Up    Pt returns for follow up visit s/p lap nina    HPI:  Doing pretty well. Pain improved, incisions healing well. She is still having some nausea and \"chills\". No jaundice or pale stools. She has never had colonoscopy.      Past Medical History:   Diagnosis Date     Anxiety      Cholelithiasis 10/1/2013     Depressive disorder      Dysmenorrhea      Menometrorrhagia      PONV (postoperative nausea and vomiting)      Splenomegaly 10/1/2013     Substance abuse (H)      Thyroid disease        Past Surgical History:   Procedure Laterality Date     ABDOMEN SURGERY       GYN SURGERY      ablation     LAPAROSCOPIC APPENDECTOMY  7/19/2012    Procedure: LAPAROSCOPIC APPENDECTOMY;  laparoscopic appendectomy, abscess drainage, lysis of adhesions;  Surgeon: Jeffrey Adair MD;  [facility]     LAPAROSCOPIC CHOLECYSTECTOMY N/A 2/25/2019    Procedure: LAPAROSCOPIC CHOLECYSTECTOMY;  Surgeon: Antoine García DO;  [facility]: [facility]     LAPAROSCOPIC LYSIS ADHESIONS  7/19/2012    Procedure: LAPAROSCOPIC LYSIS ADHESIONS;;  Surgeon: Jeffrey Adair MD;  [facility]: [facility]     ORTHOPEDIC SURGERY         Social History     Socioeconomic History     Marital status:      Spouse name: Not on file     Number of children: Not on file     Years of education: Not on file     Highest education level: Not on file   Occupational History     Not on file   Social Needs     Financial resource strain: Not on file     Food insecurity:     Worry: Not on file     Inability: Not on file     Transportation needs:     Medical: Not on file     Non-medical: Not on file   Tobacco Use     Smoking status: Current Every Day Smoker     Packs/day: 1.00     Years: 30.00     Pack years: 30.00     Smokeless tobacco: Never Used   Substance and Sexual Activity     Alcohol use: No     Comment: Alcohol free for 19 years     Drug use: No     Sexual activity: Yes     Partners: Male     Birth control/protection: None " "  Lifestyle     Physical activity:     Days per week: Not on file     Minutes per session: Not on file     Stress: Not on file   Relationships     Social connections:     Talks on phone: Not on file     Gets together: Not on file     Attends Buddhist service: Not on file     Active member of club or organization: Not on file     Attends meetings of clubs or organizations: Not on file     Relationship status: Not on file     Intimate partner violence:     Fear of current or ex partner: Not on file     Emotionally abused: Not on file     Physically abused: Not on file     Forced sexual activity: Not on file   Other Topics Concern     Parent/sibling w/ CABG, MI or angioplasty before 65F 55M? Not Asked   Social History Narrative     Not on file       Current Outpatient Medications   Medication Sig Dispense Refill     busPIRone HCl (BUSPAR) 30 MG tablet   0     escitalopram (LEXAPRO) 20 MG tablet Take 20 mg by mouth daily.       levothyroxine (SYNTHROID) 125 MCG tablet Take 125 mcg by mouth daily.       lorazepam (ATIVAN) 1 MG tablet Take 0.5 mg by mouth 2 times daily as needed        OMEPRAZOLE PO Take 20 mg by mouth every other day       ondansetron (ZOFRAN ODT) 4 MG ODT tab Take 1 tablet (4 mg) by mouth every 8 hours as needed for nausea 10 tablet 0     oxyCODONE-acetaminophen (PERCOCET) 5-325 MG tablet Take 1-2 tablets by mouth every 4 hours as needed for pain 12 tablet 0     PROAIR  (90 Base) MCG/ACT inhaler INL 2 PFS PO Q 4 H PRN  0     valACYclovir (VALTREX) 1000 mg tablet Take 1 g by mouth         Medications and history reviewed    Physical exam:  Vitals: /64   Temp 96.9  F (36.1  C) (Temporal)   Ht 1.715 m (5' 7.5\")   Wt 88.5 kg (195 lb)   BMI 30.09 kg/m    BMI= Body mass index is 30.09 kg/m .    HEART: RRR, no new murmurs  LUNGS: CTAB, equal chest rise, good effort  ABD: soft, non tender, non distended  INCISIONS: c/d/i  EXT: BRIDGES, no deformities    PATHOLOGY:  Calculous chronic " cholecystitis    Assessment:     ICD-10-CM    1. S/P laparoscopic cholecystectomy Z90.49      Plan: Path reviewed, questions answered. She should establish care with a PCP and if her problems persist, she might benefit from c-scope/EGD. She understands and will schedule an appt with a PCP.    Antoine García, DO

## 2019-04-17 ENCOUNTER — TELEPHONE (OUTPATIENT)
Dept: FAMILY MEDICINE | Facility: OTHER | Age: 59
End: 2019-04-17

## 2019-04-17 NOTE — LETTER
[facility]  [address] [place] [id number]  Phone: [phone]  April 30, 2019      Amisha Lerma  [address] [address] [place] [id number]      Dear Amisha,    We care about your health and have reviewed your health plan including your medical conditions, medications, and lab results.  Based on this review, it is recommended that you follow up regarding the following health topic(s):  -Colon Cancer Screening  -Cervical Cancer Screening  -Wellness (Physical) Visit     We recommend you take the following action(s):  -schedule a MAMMOGRAM which is due. Please disregard this reminder if you have had this exam elsewhere within the last 1-2 years please let us know so we can update your records.  -schedule a PAP SMEAR EXAM which is due.  Please disregard this reminder if you have had this exam elsewhere within the last year.  It would be helpful for us to have a copy of your recent pap smear report to update your records.     Please call us at the [facility] - [phone] (or use [employer]) to address the above recommendations.     Thank you for trusting [facility] and we appreciate the opportunity to serve you.  We look forward to supporting your healthcare needs in the future.    Healthy Regards,    Your Health Care Team  [facility]

## 2019-04-17 NOTE — TELEPHONE ENCOUNTER
Summary:    Patient is due/failing the following:   MAMMOGRAM, PAP and PHYSICAL    Action needed:   Patient needs office visit for PAP/Physical. and schedule a mammogram     Type of outreach:    Phone, left message for patient to call back.     Questions for provider review:    None                                                                                                                                    Heydi Waters       Chart routed to Care Team .          Panel Management Review      Patient has the following on her problem list:     Depression / Dysthymia review    Measure:  Needs PHQ-9 score of 4 or less during index window.  Administer PHQ-9 and if score is 5 or more, send encounter to provider for next steps.      PHQ-9 SCORE 7/17/2012 10/2/2013   PHQ-9 Total Score 9 25       If PHQ-9 recheck is 5 or more, route to provider for next steps.    Patient is due for:  PHQ9      Composite cancer screening  Chart review shows that this patient is due/due soon for the following Pap Smear and Mammogram

## 2019-07-19 ENCOUNTER — TELEPHONE (OUTPATIENT)
Dept: FAMILY MEDICINE | Facility: OTHER | Age: 59
End: 2019-07-19

## 2019-07-19 NOTE — TELEPHONE ENCOUNTER
Summary:    Patient is due/failing the following:   LDL, MAMMOGRAM, PAP and PHYSICAL    Action needed:   Patient needs office visit for Physical and PAP., Patient needs fasting lab only appointment and schedule a mammogram     Type of outreach:    Phone, left message for patient to call back.     Questions for provider review:    None                                                                                                                                    Heydi Nydianasra     Chart routed to Care Team .          Panel Management Review      Patient has the following on her problem list:     Depression / Dysthymia review    Measure:  Needs PHQ-9 score of 4 or less during index window.  Administer PHQ-9 and if score is 5 or more, send encounter to provider for next steps.        PHQ-9 SCORE 7/17/2012 10/2/2013   PHQ-9 Total Score 9 25       If PHQ-9 recheck is 5 or more, route to provider for next steps.    Patient is due for:  PHQ9      Composite cancer screening  Chart review shows that this patient is due/due soon for the following Pap Smear and Mammogram

## 2019-07-19 NOTE — LETTER
[facility]  [address] [place] [id number]  Phone: [phone]  August 19, 2019      Amisha Lerma  [address] [address] [place], [place] [address] [address]      Dear Amisha,    We care about your health and have reviewed your health plan including your medical conditions, medications, and lab results.  Based on this review, it is recommended that you follow up regarding the following health topic(s):  -Breast Cancer Screening  -Cervical Cancer Screening  -Wellness (Physical) Visit     We recommend you take the following action(s):  -schedule a WELLNESS (Physical) APPOINTMENT.  We will perform the following labs: Lipids (fasting cholesterol - nothing to eat except water and/or meds for 8-10 hours).  -schedule a MAMMOGRAM which is due. Please disregard this reminder if you have had this exam elsewhere within the last 1-2 years please let us know so we can update your records.  -schedule a PAP SMEAR EXAM which is due.  Please disregard this reminder if you have had this exam elsewhere within the last year.  It would be helpful for us to have a copy of your recent pap smear report to update your records.     Please call us at the [facility] - [phone] (or use "[employer] to address the above recommendations.     Thank you for trusting [facility] and we appreciate the opportunity to serve you.  We look forward to supporting your healthcare needs in the future.    Healthy Regards,    Your Health Care Team  [facility]

## 2019-07-29 ENCOUNTER — TRANSFERRED RECORDS (OUTPATIENT)
Dept: HEALTH INFORMATION MANAGEMENT | Facility: CLINIC | Age: 59
End: 2019-07-29

## 2019-07-29 LAB
ALT SERPL-CCNC: 17 IU/L (ref 8–45)
CREAT SERPL-MCNC: 1 MG/DL (ref 0.57–1.11)
GFR SERPL CREATININE-BSD FRML MDRD: 57 ML/MIN/1.73M2
PHQ9 SCORE: 17
TSH SERPL-ACNC: 3.25 UIU/ML (ref 0.35–4.94)

## 2019-09-23 ENCOUNTER — HOSPITAL ENCOUNTER (EMERGENCY)
Facility: CLINIC | Age: 59
Discharge: HOME | End: 2019-09-23
Attending: EMERGENCY MEDICINE | Admitting: EMERGENCY MEDICINE
Payer: COMMERCIAL

## 2019-09-23 ENCOUNTER — NURSE TRIAGE (OUTPATIENT)
Dept: INTERNAL MEDICINE | Facility: CLINIC | Age: 59
End: 2019-09-23

## 2019-09-23 VITALS
BODY MASS INDEX: 30.09 KG/M2 | RESPIRATION RATE: 18 BRPM | DIASTOLIC BLOOD PRESSURE: 95 MMHG | HEART RATE: 68 BPM | TEMPERATURE: 98.1 F | WEIGHT: 195 LBS | SYSTOLIC BLOOD PRESSURE: 151 MMHG | OXYGEN SATURATION: 99 %

## 2019-09-23 DIAGNOSIS — M79.10 MYALGIA: ICD-10-CM

## 2019-09-23 LAB
ALBUMIN SERPL-MCNC: 3.9 G/DL (ref 3.4–5)
ALP SERPL-CCNC: 85 U/L (ref 40–150)
ALT SERPL W P-5'-P-CCNC: 20 U/L (ref 0–50)
ANION GAP SERPL CALCULATED.3IONS-SCNC: 7 MMOL/L (ref 3–14)
AST SERPL W P-5'-P-CCNC: 21 U/L (ref 0–45)
BASOPHILS # BLD AUTO: 0 10E9/L (ref 0–0.2)
BASOPHILS NFR BLD AUTO: 0.4 %
BILIRUB SERPL-MCNC: 0.4 MG/DL (ref 0.2–1.3)
BUN SERPL-MCNC: 12 MG/DL (ref 7–30)
CALCIUM SERPL-MCNC: 9.3 MG/DL (ref 8.5–10.1)
CHLORIDE SERPL-SCNC: 107 MMOL/L (ref 94–109)
CO2 SERPL-SCNC: 28 MMOL/L (ref 20–32)
CREAT SERPL-MCNC: 0.94 MG/DL (ref 0.52–1.04)
CRP SERPL-MCNC: <2.9 MG/L (ref 0–8)
DIFFERENTIAL METHOD BLD: NORMAL
EOSINOPHIL NFR BLD AUTO: 2.1 %
ERYTHROCYTE [DISTWIDTH] IN BLOOD BY AUTOMATED COUNT: 13.2 % (ref 10–15)
ERYTHROCYTE [SEDIMENTATION RATE] IN BLOOD BY WESTERGREN METHOD: 8 MM/H (ref 0–30)
GFR SERPL CREATININE-BSD FRML MDRD: 66 ML/MIN/{1.73_M2}
GLUCOSE SERPL-MCNC: 93 MG/DL (ref 70–99)
HCT VFR BLD AUTO: 46.1 % (ref 35–47)
HGB BLD-MCNC: 15.5 G/DL (ref 11.7–15.7)
IMM GRANULOCYTES # BLD: 0 10E9/L (ref 0–0.4)
IMM GRANULOCYTES NFR BLD: 0.3 %
LYMPHOCYTES # BLD AUTO: 1.4 10E9/L (ref 0.8–5.3)
LYMPHOCYTES NFR BLD AUTO: 18.6 %
MCH RBC QN AUTO: 30 PG (ref 26.5–33)
MCHC RBC AUTO-ENTMCNC: 33.6 G/DL (ref 31.5–36.5)
MCV RBC AUTO: 89 FL (ref 78–100)
MONOCYTES # BLD AUTO: 0.6 10E9/L (ref 0–1.3)
MONOCYTES NFR BLD AUTO: 8.3 %
NEUTROPHILS # BLD AUTO: 5.4 10E9/L (ref 1.6–8.3)
NEUTROPHILS NFR BLD AUTO: 70.3 %
NRBC # BLD AUTO: 0 10*3/UL
NRBC BLD AUTO-RTO: 0 /100
PLATELET # BLD AUTO: 180 10E9/L (ref 150–450)
POTASSIUM SERPL-SCNC: 3.9 MMOL/L (ref 3.4–5.3)
PROT SERPL-MCNC: 7.6 G/DL (ref 6.8–8.8)
RBC # BLD AUTO: 5.17 10E12/L (ref 3.8–5.2)
SODIUM SERPL-SCNC: 142 MMOL/L (ref 133–144)
TROPONIN I SERPL-MCNC: <0.015 UG/L (ref 0–0.04)
WBC # BLD AUTO: 7.7 10E9/L (ref 4–11)

## 2019-09-23 PROCEDURE — 25000128 H RX IP 250 OP 636: Performed by: EMERGENCY MEDICINE

## 2019-09-23 PROCEDURE — 99284 EMERGENCY DEPT VISIT MOD MDM: CPT | Mod: 25 | Performed by: EMERGENCY MEDICINE

## 2019-09-23 PROCEDURE — 96374 THER/PROPH/DIAG INJ IV PUSH: CPT | Performed by: EMERGENCY MEDICINE

## 2019-09-23 PROCEDURE — 85025 COMPLETE CBC W/AUTO DIFF WBC: CPT | Performed by: EMERGENCY MEDICINE

## 2019-09-23 PROCEDURE — 85652 RBC SED RATE AUTOMATED: CPT | Performed by: EMERGENCY MEDICINE

## 2019-09-23 PROCEDURE — 84484 ASSAY OF TROPONIN QUANT: CPT | Performed by: EMERGENCY MEDICINE

## 2019-09-23 PROCEDURE — 86140 C-REACTIVE PROTEIN: CPT | Performed by: EMERGENCY MEDICINE

## 2019-09-23 PROCEDURE — 80053 COMPREHEN METABOLIC PANEL: CPT | Performed by: EMERGENCY MEDICINE

## 2019-09-23 PROCEDURE — 93005 ELECTROCARDIOGRAM TRACING: CPT | Performed by: EMERGENCY MEDICINE

## 2019-09-23 PROCEDURE — 93010 ELECTROCARDIOGRAM REPORT: CPT | Mod: Z6 | Performed by: EMERGENCY MEDICINE

## 2019-09-23 RX ORDER — KETOROLAC TROMETHAMINE 30 MG/ML
30 INJECTION, SOLUTION INTRAMUSCULAR; INTRAVENOUS ONCE
Status: COMPLETED | OUTPATIENT
Start: 2019-09-23 | End: 2019-09-23

## 2019-09-23 RX ADMIN — KETOROLAC TROMETHAMINE 30 MG: 30 INJECTION, SOLUTION INTRAMUSCULAR at 14:12

## 2019-09-23 NOTE — TELEPHONE ENCOUNTER
"Patient is very anxious about her family history and here symptoms of increasing tingling and weakness in her arms and hands.    This RN could go with protocol on anxiety or arm problems, but chose to pick this \"no protocol\" due to unable to determine if it is the Anxiety causing physical symptoms or the physical symptoms causing the anxiety.  RN advised patient to go to ER at this time.      Additional Information    Negative: Nursing judgment    Negative: Nursing judgment    Negative: Nursing judgment    Negative: Nursing judgment    Negative: Information only question and nurse able to answer    Answer Assessment - Initial Assessment Questions  1. REASON FOR CALL or QUESTION: \"What is your reason for calling today?\" or \"How can I best help you?\" or \"What question do you have that I can help answer?\"      Patient is audibly sobbing and having to take a breath in order to talk about her symptoms at this time.  She states that gradually in the past week she has been having arm and hand tingling and numbness, with muscle weakness in both arms.  Patient states she has been nauseous, having heart palpitations, and her anxiety is very high at this time due to these symptoms.  Patient is requesting help on the direction of care she is able to access.  Patient stated she thought the ER was only for after clinic hours.  This RN stated that the ER is 24/7.  Advised patient to go to ER at this time, someone else to drive.  Advised if unable to find someone to drive her to phone 911.  Patient stated understanding.  Patient states she will have someone to take her to there ER at this time.    Protocols used: NO PROTOCOL AVAILABLE - INFORMATION ONLY-A-OH    Protocol:  Nicolette Hernandez fifth edition  Anxiety  Heart beat fast  Weakness    Nancy Marin RN    "

## 2019-09-23 NOTE — ED AVS SNAPSHOT
[facility] Emergency Department  [address] [place] [address]  Phone:  [phone]  Fax:  [phone]                                    Amisha Lerma   MRN: [id number]    Department:  [facility] Emergency Department   Date of Visit:  9/23/2019           After Visit Summary Signature Page    I have received my discharge instructions, and my questions have been answered. I have discussed any challenges I see with this plan with the nurse or doctor.    ..........................................................................................................................................  Patient/Patient Representative Signature      ..........................................................................................................................................  Patient Representative Print Name and Relationship to Patient    ..................................................               ................................................  Date                                   Time    ..........................................................................................................................................  Reviewed by Signature/Title    ...................................................              ..............................................  Date                                               Time          22EPIC Rev 08/18

## 2019-09-23 NOTE — ED PROVIDER NOTES
"  History     Chief Complaint   Patient presents with     Generalized Body Aches     HPI  Amisha Lerma is a 59 year old female who presents to the emergency department for generalized body aches. Patient reports having pain \"all over\" for a couple of weeks. She states having left arm pain along with right arm pain to which she is unable to lift it on her own. Patient states she was initially having numbness in her right arm and is now having pain along with a headache and chills. Patient reports having chest pain on and off for a couple of weeks. Patient is very confused and upset not knowing what is wrong. She tried to take Ibuprofen and Tylenol for the pain yesterday with no relief. She has not taken anything today for her pain. Patient denies any shortness of breath, chronic inflammatory disease or RA. Her primary care provider is in [place], [place]. She used to live down there, work down there and grew up there. Now she is living in [place] with her  and still doctors down there.  There has been no recent trauma.      Allergies:  Allergies   Allergen Reactions     Dilaudid [Hydromorphone Hcl]      Makes her \"pukey\" after surgery     Fentanyl      Other reaction(s): Agitation, Intolerance-Can't Take     Nickel        Problem List:    Patient Active Problem List    Diagnosis Date Noted     Marijuana use, episodic 02/22/2019     Priority: Medium     Abnormal LFTs 02/22/2019     Priority: Medium     Hyperlipidemia LDL goal <130 02/22/2019     Priority: Medium     Atypical chest pain 02/23/2016     Priority: Medium     Colon polyps 02/07/2014     Priority: Medium     Cholelithiasis 10/01/2013     Priority: Medium     Splenomegaly 10/01/2013     Priority: Medium     Family history of ischemic heart disease 07/17/2012     Priority: Medium     Family history of colon cancer 07/17/2012     Priority: Medium     Family history of skin cancer 07/17/2012     Priority: Medium     Family history of high cholesterol " 07/17/2012     Priority: Medium     Anxiety 06/15/2012     Priority: Medium     Major depressive disorder, single episode, mild (H) 08/01/2008     Priority: Medium     Hypothyroidism 06/15/2012     Priority: Low     Major depression in complete remission (H) 06/15/2012     Priority: Low     Tobacco abuse 06/15/2012     Priority: Low        Past Medical History:    Past Medical History:   Diagnosis Date     Anxiety      Cholelithiasis 10/1/2013     Depressive disorder      Dysmenorrhea      Menometrorrhagia      PONV (postoperative nausea and vomiting)      Splenomegaly 10/1/2013     Substance abuse (H)      Thyroid disease        Past Surgical History:    Past Surgical History:   Procedure Laterality Date     ABDOMEN SURGERY       GYN SURGERY      ablation     LAPAROSCOPIC APPENDECTOMY  7/19/2012    Procedure: LAPAROSCOPIC APPENDECTOMY;  laparoscopic appendectomy, abscess drainage, lysis of adhesions;  Surgeon: Jeffrey Adair MD;  [facility]     LAPAROSCOPIC CHOLECYSTECTOMY N/A 2/25/2019    Procedure: LAPAROSCOPIC CHOLECYSTECTOMY;  Surgeon: Antoine García DO;  Location: [facility] OR     LAPAROSCOPIC LYSIS ADHESIONS  7/19/2012    Procedure: LAPAROSCOPIC LYSIS ADHESIONS;;  Surgeon: Jeffrey Adair MD;  Location: [facility] OR     ORTHOPEDIC SURGERY         Family History:    Family History   Problem Relation Age of Onset     Cancer Brother         lung and brain       Social History:  Marital Status:   [2]  Social History     Tobacco Use     Smoking status: Current Every Day Smoker     Packs/day: 1.00     Years: 30.00     Pack years: 30.00     Smokeless tobacco: Never Used   Substance Use Topics     Alcohol use: No     Comment: Alcohol free for 19 years     Drug use: No        Medications:    busPIRone HCl (BUSPAR) 30 MG tablet  escitalopram (LEXAPRO) 20 MG tablet  levothyroxine (SYNTHROID) 125 MCG tablet  lorazepam (ATIVAN) 1 MG tablet  OMEPRAZOLE PO  ondansetron (ZOFRAN ODT) 4 MG ODT  tab  oxyCODONE-acetaminophen (PERCOCET) 5-325 MG tablet  PROAIR  (90 Base) MCG/ACT inhaler  valACYclovir (VALTREX) 1000 mg tablet          Review of Systems  All other systems are reviewed and are negative    Physical Exam   BP: (!) 151/95  Pulse: 68  Temp: 98.1  F (36.7  C)  Resp: 18  Weight: 88.5 kg (195 lb)  SpO2: 99 %      Physical Exam  Vitals signs and nursing note reviewed.   Constitutional:       General: She is in acute distress (intermittently tearful and yelling ).      Appearance: She is well-developed. She is not diaphoretic.   HENT:      Head: Normocephalic and atraumatic.   Eyes:      General: No scleral icterus.     Pupils: Pupils are equal, round, and reactive to light.   Neck:      Musculoskeletal: Normal range of motion and neck supple.   Cardiovascular:      Rate and Rhythm: Normal rate and regular rhythm.      Heart sounds: Normal heart sounds. No murmur.   Pulmonary:      Effort: No respiratory distress.      Breath sounds: No stridor. No wheezing or rales.   Abdominal:      Palpations: Abdomen is soft.      Tenderness: There is no tenderness.   Musculoskeletal:         General: No tenderness.   Skin:     General: Skin is warm and dry.      Coloration: Skin is not pale.      Findings: No erythema or rash.   Neurological:      Mental Status: She is alert.         ED Course        Procedures          EKG reveals normal sinus rhythm at 67 bpm.  Occasional PVC.  No acute ST segment or T wave changes noted.  Interpreted by myself    Critical Care time:  none               Results for orders placed or performed during the hospital encounter of 09/23/19 (from the past 24 hour(s))   CBC with platelets differential   Result Value Ref Range    WBC 7.7 4.0 - 11.0 10e9/L    RBC Count 5.17 3.8 - 5.2 10e12/L    Hemoglobin 15.5 11.7 - 15.7 g/dL    Hematocrit 46.1 35.0 - 47.0 %    MCV 89 78 - 100 fl    MCH 30.0 26.5 - 33.0 pg    MCHC 33.6 31.5 - 36.5 g/dL    RDW 13.2 10.0 - 15.0 %    Platelet Count 180  150 - 450 10e9/L    Diff Method Automated Method     % Neutrophils 70.3 %    % Lymphocytes 18.6 %    % Monocytes 8.3 %    % Eosinophils 2.1 %    % Basophils 0.4 %    % Immature Granulocytes 0.3 %    Nucleated RBCs 0 0 /100    Absolute Neutrophil 5.4 1.6 - 8.3 10e9/L    Absolute Lymphocytes 1.4 0.8 - 5.3 10e9/L    Absolute Monocytes 0.6 0.0 - 1.3 10e9/L    Absolute Basophils 0.0 0.0 - 0.2 10e9/L    Abs Immature Granulocytes 0.0 0 - 0.4 10e9/L    Absolute Nucleated RBC 0.0    Comprehensive metabolic panel   Result Value Ref Range    Sodium 142 133 - 144 mmol/L    Potassium 3.9 3.4 - 5.3 mmol/L    Chloride 107 94 - 109 mmol/L    Carbon Dioxide 28 20 - 32 mmol/L    Anion Gap 7 3 - 14 mmol/L    Glucose 93 70 - 99 mg/dL    Urea Nitrogen 12 7 - 30 mg/dL    Creatinine 0.94 0.52 - 1.04 mg/dL    GFR Estimate 66 >60 mL/min/[1.73_m2]    GFR Estimate If Black 77 >60 mL/min/[1.73_m2]    Calcium 9.3 8.5 - 10.1 mg/dL    Bilirubin Total 0.4 0.2 - 1.3 mg/dL    Albumin 3.9 3.4 - 5.0 g/dL    Protein Total 7.6 6.8 - 8.8 g/dL    Alkaline Phosphatase 85 40 - 150 U/L    ALT 20 0 - 50 U/L    AST 21 0 - 45 U/L   Troponin I   Result Value Ref Range    Troponin I ES <0.015 0.000 - 0.045 ug/L   Erythrocyte sedimentation rate auto   Result Value Ref Range    Sed Rate 8 0 - 30 mm/h   CRP inflammation   Result Value Ref Range    CRP Inflammation <2.9 0.0 - 8.0 mg/L       Medications   ketorolac (TORADOL) injection 30 mg (30 mg Intravenous Given 9/23/19 1412)       Assessments & Plan (with Medical Decision Making)  59-year-old female who presents in emotional distress crying stating she is been having ongoing arm pain for the last 2 to 3 weeks and she wants to get to the bottom of this.  She has a history of depression, anxiety and substance abuse noted in her chart.  Exam revealed no signs of acute emergency medical condition.  Labs do not reveal any evidence for inflammatory process with a normal white count, sed rate and CRP.  EKG and troponin  without acute finding.  I tried to reassure her that we did not see anything serious from a medical standpoint.  I tried to bring up her depression and anxiety and she became tearful, frustrated and yelled at me this was not about her depression or anxiety.  We did give her a dose of Toradol.  I let her know we would not be able to give her any opiates for her pain.  She began yelling again and frustrated but this was brought up.  I just let her know we were trying to be upfront about a treatment plan and option for her.  I recommended she follow-up with her primary care physician.  Return anytime sooner if condition worsens or other concern.     I have reviewed the nursing notes.    I have reviewed the findings, diagnosis, plan and need for follow up with the patient.       Discharge Medication List as of 9/23/2019  2:54 PM          Final diagnoses:   Myalgia     This document serves as a record of services personally performed by Ari Sims MD. It was created on their behalf by Chela Lawler, a trained medical scribe. The creation of this record is based on the provider's personal observations and the statements of the patient. This document has been checked and approved by the attending provider.    Note: Chart documentation done in part with Dragon Voice Recognition software. Although reviewed after completion, some word and grammatical errors may remain.    9/23/2019   [facility] EMERGENCY DEPARTMENT     Ari Sims MD  09/23/19 1539

## 2019-10-14 NOTE — PROGRESS NOTES
Subjective     Amisha Lerma is a 59 year old female who presents to clinic today for the following health issues:    HPI   Sliver left index finger DOI 10/14/19    Patient reports yesterday afternoon she ran her hand across a part on her deck and thinks she got a large splint into her left index finger. Tried to push it out without success. Reports immediate swelling. Now appearing more red today. No drainage. Tetanus is up to date.     Patient Active Problem List   Diagnosis     Hypothyroidism     Major depression in complete remission (H)     Anxiety     Tobacco abuse     Family history of ischemic heart disease     Family history of colon cancer     Family history of skin cancer     Family history of high cholesterol     Cholelithiasis     Splenomegaly     Atypical chest pain     Colon polyps     Major depressive disorder, single episode, mild (H)     Marijuana use, episodic     Abnormal LFTs     Hyperlipidemia LDL goal <130     Past Surgical History:   Procedure Laterality Date     ABDOMEN SURGERY       GYN SURGERY      ablation     LAPAROSCOPIC APPENDECTOMY  7/19/2012    Procedure: LAPAROSCOPIC APPENDECTOMY;  laparoscopic appendectomy, abscess drainage, lysis of adhesions;  Surgeon: Jeffrey Adair MD;  [facility]     LAPAROSCOPIC CHOLECYSTECTOMY N/A 2/25/2019    Procedure: LAPAROSCOPIC CHOLECYSTECTOMY;  Surgeon: Antoine García DO;  [facility]: [facility] OR     LAPAROSCOPIC LYSIS ADHESIONS  7/19/2012    Procedure: LAPAROSCOPIC LYSIS ADHESIONS;;  Surgeon: Jeffrey Adair MD;  Location: [facility] OR     ORTHOPEDIC SURGERY         Social History     Tobacco Use     Smoking status: Current Every Day Smoker     Packs/day: 1.00     Years: 30.00     Pack years: 30.00     Smokeless tobacco: Never Used   Substance Use Topics     Alcohol use: No     Comment: Alcohol free for 19 years     Family History   Problem Relation Age of Onset     Cancer Brother         lung and brain         Current Outpatient  "Medications   Medication Sig Dispense Refill     cephALEXin (KEFLEX) 500 MG capsule Take 1 capsule (500 mg) by mouth 2 times daily for 7 days 14 capsule 0     escitalopram (LEXAPRO) 20 MG tablet Take 20 mg by mouth daily.       levothyroxine (SYNTHROID) 125 MCG tablet Take 125 mcg by mouth daily.       lorazepam (ATIVAN) 1 MG tablet Take 0.5 mg by mouth 2 times daily as needed        OMEPRAZOLE PO Take 20 mg by mouth every other day       PROAIR  (90 Base) MCG/ACT inhaler INL 2 PFS PO Q 4 H PRN  0     valACYclovir (VALTREX) 1000 mg tablet Take 1 g by mouth       Allergies   Allergen Reactions     Dilaudid [Hydromorphone Hcl]      Makes her \"pukey\" after surgery     Fentanyl      Other reaction(s): Agitation, Intolerance-Can't Take     Nickel      BP Readings from Last 3 Encounters:   10/15/19 134/82   09/23/19 (!) 151/95   03/14/19 116/64    Wt Readings from Last 3 Encounters:   10/15/19 89.8 kg (198 lb)   09/23/19 88.5 kg (195 lb)   03/14/19 88.5 kg (195 lb)         Reviewed and updated as needed this visit by Provider         Review of Systems   ROS COMP: Constitutional, HEENT, cardiovascular, pulmonary, gi and gu systems are negative, except as otherwise noted.      Objective    /82   Pulse 60   Temp 97.7  F (36.5  C) (Temporal)   Resp 16   Ht 1.715 m (5' 7.5\")   Wt 89.8 kg (198 lb)   SpO2 96%   BMI 30.55 kg/m    Body mass index is 30.55 kg/m .  Physical Exam   GENERAL: healthy, alert and no distress  SKIN: soft tissue swelling and erythema affecting the distal tip of the left index finger. Palpable foreign body/splinter present  PSYCH: mentation appears normal, affect normal/bright    Diagnostic Test Results:  Labs reviewed in Epic  none     Procedure:   Area was cleaned using alcohol x 3. A digital block was performed. Using a #11 blade a 2 mm incision was made. I was able to grasp the splinter and remove this entirely. Bacitracin and bandage applied. Patient tolerated the procedure well.    "      Assessment & Plan     1. Foreign body in skin of finger, initial encounter  Local wound care discussed with patient today. Discussed close monitoring of symptoms. Concerned she may be developing an infection. Keflex prescribed as below. Patient will follow-up in clinic if new symptoms develop or current symptoms fail to improve.  - cephALEXin (KEFLEX) 500 MG capsule; Take 1 capsule (500 mg) by mouth 2 times daily for 7 days  Dispense: 14 capsule; Refill: 0  - REMOVE FOREIGN BODY SIMPLE    The patient indicates understanding of these issues and agrees with the plan.    Prema Porter PA-C  [facility]

## 2019-10-15 ENCOUNTER — OFFICE VISIT (OUTPATIENT)
Dept: FAMILY MEDICINE | Facility: OTHER | Age: 59
End: 2019-10-15
Payer: COMMERCIAL

## 2019-10-15 VITALS
HEART RATE: 60 BPM | HEIGHT: 68 IN | RESPIRATION RATE: 16 BRPM | BODY MASS INDEX: 30.01 KG/M2 | DIASTOLIC BLOOD PRESSURE: 82 MMHG | WEIGHT: 198 LBS | SYSTOLIC BLOOD PRESSURE: 134 MMHG | OXYGEN SATURATION: 96 % | TEMPERATURE: 97.7 F

## 2019-10-15 DIAGNOSIS — S60.459A FOREIGN BODY IN SKIN OF FINGER, INITIAL ENCOUNTER: Primary | ICD-10-CM

## 2019-10-15 PROCEDURE — 10120 INC&RMVL FB SUBQ TISS SMPL: CPT | Performed by: PHYSICIAN ASSISTANT

## 2019-10-15 PROCEDURE — 99207 ZZC DROP WITH A PROCEDURE: CPT | Performed by: PHYSICIAN ASSISTANT

## 2019-10-15 RX ORDER — CEPHALEXIN 500 MG/1
500 CAPSULE ORAL 2 TIMES DAILY
Qty: 14 CAPSULE | Refills: 0 | Status: SHIPPED | OUTPATIENT
Start: 2019-10-15 | End: 2019-10-29

## 2019-10-15 ASSESSMENT — MIFFLIN-ST. JEOR: SCORE: 1513.68

## 2019-10-24 ENCOUNTER — TELEPHONE (OUTPATIENT)
Dept: BEHAVIORAL HEALTH | Facility: CLINIC | Age: 59
End: 2019-10-24

## 2019-10-24 NOTE — TELEPHONE ENCOUNTER
[facility] PHQ-9 Follow-up  Behavioral Health Clinician Triage Service    [facility] PHQ-9 Responses:  [facility] Follow-up to PHQ 10/21/2019 10/21/2019   PHQ-9 9. Suicide Ideation past 2 weeks Several days Several days   Thoughts of suicide or self harm in past 2 weeks No No   Thoughts of suicide or self harm in past 2 weeks No No   PHQ-9 Safety concerns? Yes Yes   PHQ-9 Safety concerns? Yes Yes        1st Outreach Date: October 24, 2019 Time: 10:40  Outcome: Left a message for patient to call [facility].  If patient doesn't return the call the [facility] Pool will make one more phone attempt within 24 hours.    LIANA Dunaway, Behavioral Health Clinician  2nd Outreach Date: October 24, 2019 Time: 3:43  Outcome: Left a message.  See disposition below.  LIANA Dunaway, [facility]    Disposition:    - Recommendations / Safety Plan: [facility] was unable to reach patient. Messages have been left requesting a returned call. This writer has also sent her a message through [employer] and included crisis numbers. Patient denied having thoughts of suicide or self harm, however marked that she has safety concerns for herself or others. Patient cancelled her physical with one doctor and did reschedule with another for 10/29/19 and that appointment was scheduled just yesterday. This writer also did attempt to reach patient's spouse, whom is her emergency contact and left a message. This note is being routed to the provider whom patient is scheduled with next week for awareness so that he can also address this with patient during her appointment.       [facility] made a third outreach on 10/25/2019 at 9:30am. Patient answered and was driving. She was able to have a brief conversation with this writer. She stated that she had cancelled the appointment with the doctor to schedule with the specialist. She reported that she has been having suicidal thoughts and denies having any plan or intent. She states that she talks with her family and friends when she has more  difficulty. She states that this is common for her this time of year. She denies any history of suicide attempts. She is taking medication. She plans to talk about this at her scheduled visit next week. She is also open with scheduling an appointment with this writer. Informed her of how to schedule and she wanted to call back as she did not have a way to write anything down. Patient agrees to call her spouse, family and friends, if her mood worsens. If she develops any suicidal plan or intent she agrees to call 911 and present to the ER.    LIANA Dunaway, Behavioral Health Clinician

## 2019-10-29 ENCOUNTER — OFFICE VISIT (OUTPATIENT)
Dept: OBGYN | Facility: CLINIC | Age: 59
End: 2019-10-29
Payer: COMMERCIAL

## 2019-10-29 VITALS
BODY MASS INDEX: 29.04 KG/M2 | DIASTOLIC BLOOD PRESSURE: 80 MMHG | HEART RATE: 62 BPM | SYSTOLIC BLOOD PRESSURE: 128 MMHG | HEIGHT: 68 IN | WEIGHT: 191.6 LBS

## 2019-10-29 DIAGNOSIS — Z72.0 TOBACCO ABUSE: ICD-10-CM

## 2019-10-29 DIAGNOSIS — Z12.4 CERVICAL CANCER SCREENING: ICD-10-CM

## 2019-10-29 DIAGNOSIS — Z12.31 ENCOUNTER FOR SCREENING MAMMOGRAM FOR BREAST CANCER: ICD-10-CM

## 2019-10-29 DIAGNOSIS — N94.89 UTERINE CRAMPING: ICD-10-CM

## 2019-10-29 DIAGNOSIS — Z00.00 ANNUAL PHYSICAL EXAM: Primary | ICD-10-CM

## 2019-10-29 PROCEDURE — 99386 PREV VISIT NEW AGE 40-64: CPT | Performed by: OBSTETRICS & GYNECOLOGY

## 2019-10-29 PROCEDURE — 87624 HPV HI-RISK TYP POOLED RSLT: CPT | Performed by: OBSTETRICS & GYNECOLOGY

## 2019-10-29 PROCEDURE — G0145 SCR C/V CYTO,THINLAYER,RESCR: HCPCS | Performed by: OBSTETRICS & GYNECOLOGY

## 2019-10-29 ASSESSMENT — MIFFLIN-ST. JEOR: SCORE: 1484.65

## 2019-10-29 NOTE — NURSING NOTE
"Chief Complaint   Patient presents with     Physical       Initial /80 (BP Location: Right arm, Patient Position: Chair, Cuff Size: Adult Regular)   Pulse 62   Ht 1.715 m (5' 7.5\")   Wt 86.9 kg (191 lb 9.6 oz)   LMP  (LMP Unknown)   BMI 29.57 kg/m   Estimated body mass index is 29.57 kg/m  as calculated from the following:    Height as of this encounter: 1.715 m (5' 7.5\").    Weight as of this encounter: 86.9 kg (191 lb 9.6 oz).  BP completed using cuff size: regular        The following HM Due: mammogram  pap smear      The following patient reported/Care Every where data was sent to:  P ABSTRACT QUALITY INITIATIVES [[id number]]       patient has appointment for today       Deisy Santos CMA  2019    "

## 2019-10-29 NOTE — PROGRESS NOTES
CC: Wellness exam    HPI:   Amisha Lerma is a 59 year old female who presents today for a wellness exam with pap smear. Patient is feeling well with minimal concerns today. Patient reports pelvic cramping that has been present for 2-3 years that is a constant, non cyclic pain. She reports a dull/achey pain 80% of the day. She states pain is somewhat relieved with Advil and heat application. She denies vaginal pain, bleeding, discharge. Denies increased pain or relief with bowel movements. Patient also reports a history of hot flashes 3x/week mostly at night. Patient denies breast problems like soreness, discharge, masses. Patient does not get regular breast exams or mammograms. Patient does not have a history of osteoporosis but has not had a DEXA scan done. She does currently smoke 1/2 pack per day and has a 30 pack-year history.     Patient endorses a history of anxiety, depression, seasonal mood disorder. Patient is struggling with her mental health concerns but is currently stable. 6 weeks ago patient had a breakdown when she called a hotline and had emergency services come to her house. At this time they recommended psychiatric care and patient wants to see a psychiatrist but is running out of insurance. She plans to apply for state insurance and see psychiatry then. Until then she is keeping a routine going, caring for other aspects of health care like eye/dentistry.     Patient denies recent weight loss, fevers, GI upset, urinary problems, chest pain, SOB. Patient does have a history of high cholesterol but did not tolerate statins. She continues to work on diet changes for cholesterol health.     Gyn history:   Ablation done ~  History of abnormal pap smears in  with conization procedure  Last pap: 1/2/15 - NIL, HPV not tested  Mammogram: never done, due for mammo  DEXA: due (history of smoking)    Ob history:       PAST MEDICAL HISTORY:   Past Medical History:   Diagnosis Date     Anxiety   "    Cholelithiasis 10/1/2013     Depressive disorder      Dysmenorrhea      Menometrorrhagia      PONV (postoperative nausea and vomiting)      Splenomegaly 10/1/2013     Substance abuse (H)      Thyroid disease        PAST SURGICAL HISTORY:   Past Surgical History:   Procedure Laterality Date     ABDOMEN SURGERY       GYN SURGERY      ablation     LAPAROSCOPIC APPENDECTOMY  7/19/2012    Procedure: LAPAROSCOPIC APPENDECTOMY;  laparoscopic appendectomy, abscess drainage, lysis of adhesions;  Surgeon: Jeffrey Adair MD;  Location: [facility] OR     LAPAROSCOPIC CHOLECYSTECTOMY N/A 2/25/2019    Procedure: LAPAROSCOPIC CHOLECYSTECTOMY;  Surgeon: Antoine García DO;  Location: [facility] OR     LAPAROSCOPIC LYSIS ADHESIONS  7/19/2012    Procedure: LAPAROSCOPIC LYSIS ADHESIONS;;  Surgeon: Jeffrey Adair MD;  Location: [facility] OR     ORTHOPEDIC SURGERY         FAMILY HISTORY:   Family History   Problem Relation Age of Onset     Cancer Brother         lung and brain     Glaucoma Brother      Colon Cancer Mother 50     Myocardial Infarction Father      Melanoma Father      Glaucoma Brother      Diabetes Paternal Uncle        SOCIAL HISTORY:   Social History     Tobacco Use     Smoking status: Current Every Day Smoker     Packs/day: 1.00     Years: 30.00     Pack years: 30.00     Smokeless tobacco: Never Used   Substance Use Topics     Alcohol use: No     Comment: Alcohol free for 19 years     ROS: 10 point ROS negative aside for HPI.     Labs/Imaging:   US Pelvis Complete w/ Transvag and Doppler 07/10/18  IMPRESSION:   1. Unremarkable appearance of the uterus and ovaries. Blood flow is  visualized in both ovaries.  2. No free fluid in the pelvis.    Exam:   /80 (BP Location: Right arm, Patient Position: Chair, Cuff Size: Adult Regular)   Pulse 62   Ht 1.715 m (5' 7.5\")   Wt 86.9 kg (191 lb 9.6 oz)   LMP  (LMP Unknown)   BMI 29.57 kg/m    General: NAD, well appearing  Cardiac: RRR, Normal S1/S2  Resp: CTAB, " no difficulty breathing  Abd: Soft, non distended. Slightly tender to palpation in lower quadrants   MSK: Normal ROM, gait  : external genitalia without lesions, erythema, masses. Vaginal canal moist, no lesions seen. Cervical os visualized and normal. Patient had some tenderness on bimanual exam of cervix and uterus. Uterus difficult to palpate, exam limited by habitus.     Assessment and Plan:   Amisha Lerma is a 59 year old female who presents today for wellness exam and pap smear. Exam today was normal aside for cramping and uterine pain with exam. US of pelvis was normal 1 year ago but with continued pain it is reasonable to do this again.     1. Wellness exam    Pap smear/ today     Normal breast and pelvic exam    Reviewed recommendation for cervical and breast cancer screening    Discussed bone health and increased risk for osteoporosis given extensive smoking hx    2. Pelvic cramping    Repeat transvaginal US    3. Mental health     Once insurance is changed, see a psychiatrist for follow up with mental health changes and medications    Capo COLLINS on 10/29/2019 at 3:00 PM    The above note is an accurate record of my words and actions.     Jaya Lee MD  OB/GYN  October 29, 2019, 3:36 PM

## 2019-11-01 LAB
COPATH REPORT: NORMAL
PAP: NORMAL

## 2019-11-04 LAB
FINAL DIAGNOSIS: NORMAL
HPV HR 12 DNA CVX QL NAA+PROBE: NEGATIVE
HPV16 DNA SPEC QL NAA+PROBE: NEGATIVE
HPV18 DNA SPEC QL NAA+PROBE: NEGATIVE
SPECIMEN DESCRIPTION: NORMAL
SPECIMEN SOURCE CVX/VAG CYTO: NORMAL

## 2019-12-10 ENCOUNTER — TELEPHONE (OUTPATIENT)
Dept: FAMILY MEDICINE | Facility: OTHER | Age: 59
End: 2019-12-10

## 2019-12-10 NOTE — TELEPHONE ENCOUNTER
Summary:    Patient is due/failing the following:   MAMMOGRAM    Action needed:   Schedule a mammogram     Type of outreach:    Phone, left message for patient to call back.     Questions for provider review:    None                                                                                                                                    Heydi Watson CMA       Chart routed to Care Team .          Panel Management Review      Patient has the following on her problem list:     Depression / Dysthymia review    Measure:  Needs PHQ-9 score of 4 or less during index window.  Administer PHQ-9 and if score is 5 or more, send encounter to provider for next steps.        PHQ-9 SCORE 10/2/2013 10/21/2019 10/21/2019   PHQ-9 Total Score 25 - -   PHQ-9 Total Score MyChart - 19 (Moderately severe depression) 19 (Moderately severe depression)   PHQ-9 Total Score - 19 19       If PHQ-9 recheck is 5 or more, route to provider for next steps.    Patient is due for:  PHQ9      Composite cancer screening  Chart review shows that this patient is due/due soon for the following Mammogram

## 2020-02-10 ENCOUNTER — HEALTH MAINTENANCE LETTER (OUTPATIENT)
Age: 60
End: 2020-02-10

## 2020-03-05 ENCOUNTER — MYC MEDICAL ADVICE (OUTPATIENT)
Dept: FAMILY MEDICINE | Facility: OTHER | Age: 60
End: 2020-03-05

## 2020-03-05 ASSESSMENT — PATIENT HEALTH QUESTIONNAIRE - PHQ9
SUM OF ALL RESPONSES TO PHQ QUESTIONS 1-9: 13
SUM OF ALL RESPONSES TO PHQ QUESTIONS 1-9: 13

## 2020-03-06 ENCOUNTER — TELEPHONE (OUTPATIENT)
Dept: FAMILY MEDICINE | Facility: OTHER | Age: 60
End: 2020-03-06

## 2020-03-06 ASSESSMENT — PATIENT HEALTH QUESTIONNAIRE - PHQ9: SUM OF ALL RESPONSES TO PHQ QUESTIONS 1-9: 13

## 2020-03-06 NOTE — TELEPHONE ENCOUNTER
LM for patient to return call for RN triage.    PHQ9 score and answered yes to #9.     Melissa Coronel, RN BSN

## 2020-03-06 NOTE — TELEPHONE ENCOUNTER
Please call patient to triage. Patient returned completed PHQ9 with a score of 13 and answered yes to #9- thoughts of being better off dead or hurting self in someway.    Heydi Watson, [facility]

## 2020-11-04 NOTE — PROGRESS NOTES
Subjective     Amisha Lerma is a 60 year old female who presents to clinic today for the following health issues:    History of Present Illness       Mental Health Follow-up:  Patient presents to follow-up on Depression & Anxiety.Patient's depression since last visit has been:  Bad  The patient is having other symptoms associated with depression.  Patient's anxiety since last visit has been:  Bad  The patient is having other symptoms associated with anxiety.  Any significant life events: grief or loss  Patient is feeling anxious or having panic attacks.  Patient has no concerns about alcohol or drug use.     Social History  Tobacco Use    Smoking status: Current Every Day Smoker      Packs/day: 1.00      Years: 30.00      Pack years: 30    Smokeless tobacco: Never Used  Alcohol use: No    Comment: Alcohol free for 19 years  Drug use: No      Today's PHQ-9         PHQ-9 Total Score:     (P) 19   PHQ-9 Q9 Thoughts of better off dead/self-harm past 2 weeks :   (P) Several days   Thoughts of suicide or self harm:  (P) No   Self-harm Plan:        Self-harm Action:          Safety concerns for self or others: (P) No         She eats 2-3 servings of fruits and vegetables daily.She consumes 2 sweetened beverage(s) daily.She exercises with enough effort to increase her heart rate 30 to 60 minutes per day.  She exercises with enough effort to increase her heart rate 5 days per week. She is missing 1 dose(s) of medications per week.  She is not taking prescribed medications regularly due to cost of medication.     Answers for HPI/ROS submitted by the patient on 11/5/2020   Chronic problems general questions HPI Form  If you checked off any problems, how difficult have these problems made it for you to do your work, take care of things at home, or get along with other people?: Extremely difficult  PHQ9 TOTAL SCORE: 19  SHANNON 7 TOTAL SCORE: 16          Review of Systems   Constitutional, HEENT, cardiovascular, pulmonary, GI, ,  "musculoskeletal, neuro, skin, endocrine and psych systems are negative, except as otherwise noted.      Objective    /84 (BP Location: Right arm, Patient Position: Chair, Cuff Size: Adult Regular)   Pulse 70   Temp 98.1  F (36.7  C) (Temporal)   Resp 20   Ht 1.715 m (5' 7.5\")   Wt 83.5 kg (184 lb)   SpO2 97%   Breastfeeding No   BMI 28.39 kg/m    Body mass index is 28.39 kg/m .  Physical Exam   GENERAL: healthy, alert and no distress  NECK: no adenopathy, no asymmetry, masses, or scars and thyroid normal to palpation  RESP: lungs clear to auscultation - no rales, rhonchi or wheezes  CV: regular rate and rhythm, normal S1 S2, no S3 or S4, no murmur, click or rub, no peripheral edema and peripheral pulses strong  ABDOMEN: soft, nontender, no hepatosplenomegaly, no masses and bowel sounds normal  MS: no gross musculoskeletal defects noted, no edema  SKIN: no suspicious lesions or rashes  NEURO: Normal strength and tone, mentation intact and speech normal  PSYCH: mentation appears normal, affect normal/bright    No results found for this or any previous visit (from the past 24 hour(s)).        Assessment & Plan     Amisha was seen today for recheck medication.    Diagnoses and all orders for this visit:    Major depression in complete remission (H)  -     escitalopram (LEXAPRO) 20 MG tablet; Take 1.5 tablets (30 mg) by mouth daily  -     LORazepam (ATIVAN) 1 MG tablet; Take 0.5 tablets (0.5 mg) by mouth 2 times daily as needed for anxiety  -     MENTAL HEALTH REFERRAL  - Adult; Outpatient Treatment; Individual/Couples/Family/Group Therapy/Health Psychology; [facility] [place]: [facility] [phone]; We will contact you to schedule the appointment or please call with any questions    Encounter for screening for malignant neoplasm of breast, unspecified screening modality  -     MA SCREENING DIGITAL BILAT - Future  (s+30); Future    Screening for HIV (human immunodeficiency virus)    Screening for " "hyperlipidemia  -     Lipid panel reflex to direct LDL Fasting; Future    Anxiety  -     escitalopram (LEXAPRO) 20 MG tablet; Take 1.5 tablets (30 mg) by mouth daily  -     LORazepam (ATIVAN) 1 MG tablet; Take 0.5 tablets (0.5 mg) by mouth 2 times daily as needed for anxiety  -     MENTAL HEALTH REFERRAL  - Adult; Outpatient Treatment; Individual/Couples/Family/Group Therapy/Health Psychology[place] [facility] [place] [facility] [phone]; We will contact you to schedule the appointment or please call with any questions    Acquired hypothyroidism  -     MENTAL HEALTH REFERRAL  - Adult; Outpatient Treatment; Individual/Couples/Family/Group Therapy/Health Psychology[place] [facility] [place] [facility] [phone]; We will contact you to schedule the appointment or please call with any questions    Hyperlipidemia LDL goal <130         Tobacco Cessation:   reports that she has been smoking. She has a 30.00 pack-year smoking history. She has never used smokeless tobacco.  Tobacco Cessation Action Plan: Information offered: Patient not interested at this time      BMI:   Estimated body mass index is 28.39 kg/m  as calculated from the following:    Height as of this encounter: 1.715 m (5' 7.5\").    Weight as of this encounter: 83.5 kg (184 lb).   Weight management plan: Discussed healthy diet and exercise guidelines       Depression Screening Follow Up    PHQ 11/5/2020   PHQ-9 Total Score 19   Q9: Thoughts of better off dead/self-harm past 2 weeks Several days   F/U: Thoughts of suicide or self-harm No   F/U: Safety concerns No     Last PHQ-9 11/5/2020   1.  Little interest or pleasure in doing things 2   2.  Feeling down, depressed, or hopeless 3   3.  Trouble falling or staying asleep, or sleeping too much 2   4.  Feeling tired or having little energy 2   5.  Poor appetite or overeating 3   6.  Feeling bad about yourself 3   7.  Trouble concentrating 1   8.  Moving slowly or restless 2   Q9: Thoughts of better off " dead/self-harm past 2 weeks 1   PHQ-9 Total Score 19   In the past two weeks have you had thoughts of suicide or self harm? No   Do you have concerns about your personal safety or the safety of others? No                Follow Up    Follow Up Actions Taken  Referred patient back to mental health provider  Mental Health Referral placed    Discussed the following ways the patient can remain in a safe environment:  remove drugs, dispose of old medications  and be around others    Return in about 4 weeks (around 12/3/2020) for recheck of current condition, if symptoms do not improve, Medication Recheck.    Jacinto Lanier PA-C  [facility]

## 2020-11-05 ENCOUNTER — OFFICE VISIT (OUTPATIENT)
Dept: FAMILY MEDICINE | Facility: OTHER | Age: 60
End: 2020-11-05

## 2020-11-05 VITALS
BODY MASS INDEX: 27.89 KG/M2 | HEIGHT: 68 IN | TEMPERATURE: 98.1 F | SYSTOLIC BLOOD PRESSURE: 118 MMHG | OXYGEN SATURATION: 97 % | WEIGHT: 184 LBS | DIASTOLIC BLOOD PRESSURE: 84 MMHG | HEART RATE: 70 BPM | RESPIRATION RATE: 20 BRPM

## 2020-11-05 DIAGNOSIS — Z11.4 SCREENING FOR HIV (HUMAN IMMUNODEFICIENCY VIRUS): ICD-10-CM

## 2020-11-05 DIAGNOSIS — Z12.39 ENCOUNTER FOR SCREENING FOR MALIGNANT NEOPLASM OF BREAST, UNSPECIFIED SCREENING MODALITY: ICD-10-CM

## 2020-11-05 DIAGNOSIS — E78.5 HYPERLIPIDEMIA LDL GOAL <130: ICD-10-CM

## 2020-11-05 DIAGNOSIS — F41.9 ANXIETY: ICD-10-CM

## 2020-11-05 DIAGNOSIS — Z13.220 SCREENING FOR HYPERLIPIDEMIA: ICD-10-CM

## 2020-11-05 DIAGNOSIS — F32.5 MAJOR DEPRESSION IN COMPLETE REMISSION (H): Primary | ICD-10-CM

## 2020-11-05 DIAGNOSIS — E03.9 ACQUIRED HYPOTHYROIDISM: ICD-10-CM

## 2020-11-05 PROCEDURE — 99213 OFFICE O/P EST LOW 20 MIN: CPT | Performed by: PHYSICIAN ASSISTANT

## 2020-11-05 RX ORDER — LORAZEPAM 1 MG/1
0.5 TABLET ORAL 2 TIMES DAILY PRN
Qty: 30 TABLET | Refills: 0 | Status: SHIPPED | OUTPATIENT
Start: 2020-11-05

## 2020-11-05 RX ORDER — ESCITALOPRAM OXALATE 20 MG/1
30 TABLET ORAL DAILY
Qty: 135 TABLET | Refills: 1 | Status: SHIPPED | OUTPATIENT
Start: 2020-11-05

## 2020-11-05 ASSESSMENT — MIFFLIN-ST. JEOR: SCORE: 1445.18

## 2020-11-05 ASSESSMENT — ANXIETY QUESTIONNAIRES
6. BECOMING EASILY ANNOYED OR IRRITABLE: SEVERAL DAYS
7. FEELING AFRAID AS IF SOMETHING AWFUL MIGHT HAPPEN: NEARLY EVERY DAY
4. TROUBLE RELAXING: NEARLY EVERY DAY
GAD7 TOTAL SCORE: 16
7. FEELING AFRAID AS IF SOMETHING AWFUL MIGHT HAPPEN: NEARLY EVERY DAY
5. BEING SO RESTLESS THAT IT IS HARD TO SIT STILL: SEVERAL DAYS
1. FEELING NERVOUS, ANXIOUS, OR ON EDGE: NEARLY EVERY DAY
2. NOT BEING ABLE TO STOP OR CONTROL WORRYING: NEARLY EVERY DAY
3. WORRYING TOO MUCH ABOUT DIFFERENT THINGS: MORE THAN HALF THE DAYS

## 2020-11-05 ASSESSMENT — PATIENT HEALTH QUESTIONNAIRE - PHQ9
SUM OF ALL RESPONSES TO PHQ QUESTIONS 1-9: 19
SUM OF ALL RESPONSES TO PHQ QUESTIONS 1-9: 19
10. IF YOU CHECKED OFF ANY PROBLEMS, HOW DIFFICULT HAVE THESE PROBLEMS MADE IT FOR YOU TO DO YOUR WORK, TAKE CARE OF THINGS AT HOME, OR GET ALONG WITH OTHER PEOPLE: EXTREMELY DIFFICULT

## 2020-11-06 ASSESSMENT — ANXIETY QUESTIONNAIRES: GAD7 TOTAL SCORE: 16

## 2020-11-06 ASSESSMENT — PATIENT HEALTH QUESTIONNAIRE - PHQ9: SUM OF ALL RESPONSES TO PHQ QUESTIONS 1-9: 19

## 2020-11-14 ENCOUNTER — HEALTH MAINTENANCE LETTER (OUTPATIENT)
Age: 60
End: 2020-11-14

## 2021-01-15 ENCOUNTER — HEALTH MAINTENANCE LETTER (OUTPATIENT)
Age: 61
End: 2021-01-15

## 2021-09-12 ENCOUNTER — HEALTH MAINTENANCE LETTER (OUTPATIENT)
Age: 61
End: 2021-09-12

## 2021-10-28 ENCOUNTER — TELEPHONE (OUTPATIENT)
Dept: FAMILY MEDICINE | Facility: OTHER | Age: 61
End: 2021-10-28

## 2021-10-28 NOTE — TELEPHONE ENCOUNTER
Patient Quality Outreach Summary      Summary:    Patient is due/failing the following:   Breast Cancer Screening - Mammogram and Physical   Colonoscopy   Type of outreach:    Sent [employer] message.    Questions for provider review:    None                                                                                                                    Heydi Watson CMA     Chart routed to Care Team.

## 2022-02-27 ENCOUNTER — HEALTH MAINTENANCE LETTER (OUTPATIENT)
Age: 62
End: 2022-02-27

## 2022-11-19 ENCOUNTER — HEALTH MAINTENANCE LETTER (OUTPATIENT)
Age: 62
End: 2022-11-19

## 2023-04-09 ENCOUNTER — HEALTH MAINTENANCE LETTER (OUTPATIENT)
Age: 63
End: 2023-04-09

## 2024-04-06 ENCOUNTER — HEALTH MAINTENANCE LETTER (OUTPATIENT)
Age: 64
End: 2024-04-06

## 2024-06-15 ENCOUNTER — HEALTH MAINTENANCE LETTER (OUTPATIENT)
Age: 64
End: 2024-06-15

## 2025-08-26 SDOH — HEALTH STABILITY: PHYSICAL HEALTH: ON AVERAGE, HOW MANY MINUTES DO YOU ENGAGE IN EXERCISE AT THIS LEVEL?: 10 MIN

## 2025-08-26 SDOH — HEALTH STABILITY: PHYSICAL HEALTH: ON AVERAGE, HOW MANY DAYS PER WEEK DO YOU ENGAGE IN MODERATE TO STRENUOUS EXERCISE (LIKE A BRISK WALK)?: 0 DAYS

## 2025-08-26 ASSESSMENT — ANXIETY QUESTIONNAIRES
3. WORRYING TOO MUCH ABOUT DIFFERENT THINGS: NEARLY EVERY DAY
1. FEELING NERVOUS, ANXIOUS, OR ON EDGE: NEARLY EVERY DAY
GAD7 TOTAL SCORE: 15
GAD7 TOTAL SCORE: 15
7. FEELING AFRAID AS IF SOMETHING AWFUL MIGHT HAPPEN: NEARLY EVERY DAY
8. IF YOU CHECKED OFF ANY PROBLEMS, HOW DIFFICULT HAVE THESE MADE IT FOR YOU TO DO YOUR WORK, TAKE CARE OF THINGS AT HOME, OR GET ALONG WITH OTHER PEOPLE?: EXTREMELY DIFFICULT
6. BECOMING EASILY ANNOYED OR IRRITABLE: NOT AT ALL
2. NOT BEING ABLE TO STOP OR CONTROL WORRYING: NEARLY EVERY DAY
GAD7 TOTAL SCORE: 15
5. BEING SO RESTLESS THAT IT IS HARD TO SIT STILL: NOT AT ALL
7. FEELING AFRAID AS IF SOMETHING AWFUL MIGHT HAPPEN: NEARLY EVERY DAY
IF YOU CHECKED OFF ANY PROBLEMS ON THIS QUESTIONNAIRE, HOW DIFFICULT HAVE THESE PROBLEMS MADE IT FOR YOU TO DO YOUR WORK, TAKE CARE OF THINGS AT HOME, OR GET ALONG WITH OTHER PEOPLE: EXTREMELY DIFFICULT
4. TROUBLE RELAXING: NEARLY EVERY DAY

## 2025-08-26 ASSESSMENT — PATIENT HEALTH QUESTIONNAIRE - PHQ9
SUM OF ALL RESPONSES TO PHQ QUESTIONS 1-9: 18
10. IF YOU CHECKED OFF ANY PROBLEMS, HOW DIFFICULT HAVE THESE PROBLEMS MADE IT FOR YOU TO DO YOUR WORK, TAKE CARE OF THINGS AT HOME, OR GET ALONG WITH OTHER PEOPLE: EXTREMELY DIFFICULT
SUM OF ALL RESPONSES TO PHQ QUESTIONS 1-9: 18

## 2025-08-29 ENCOUNTER — OFFICE VISIT (OUTPATIENT)
Dept: FAMILY MEDICINE | Facility: CLINIC | Age: 65
End: 2025-08-29
Payer: MEDICARE

## 2025-08-29 VITALS
SYSTOLIC BLOOD PRESSURE: 122 MMHG | RESPIRATION RATE: 18 BRPM | OXYGEN SATURATION: 97 % | TEMPERATURE: 97.3 F | HEART RATE: 69 BPM | WEIGHT: 192 LBS | BODY MASS INDEX: 29.1 KG/M2 | DIASTOLIC BLOOD PRESSURE: 84 MMHG | HEIGHT: 68 IN

## 2025-08-29 DIAGNOSIS — E78.5 HYPERLIPIDEMIA LDL GOAL <130: ICD-10-CM

## 2025-08-29 DIAGNOSIS — Z13.6 SCREENING FOR CARDIOVASCULAR CONDITION: ICD-10-CM

## 2025-08-29 DIAGNOSIS — K21.00 GASTROESOPHAGEAL REFLUX DISEASE WITH ESOPHAGITIS WITHOUT HEMORRHAGE: ICD-10-CM

## 2025-08-29 DIAGNOSIS — Z00.00 ENCOUNTER FOR MEDICARE ANNUAL WELLNESS EXAM: Primary | ICD-10-CM

## 2025-08-29 DIAGNOSIS — Z13.1 SCREENING FOR DIABETES MELLITUS: ICD-10-CM

## 2025-08-29 DIAGNOSIS — F12.90 MARIJUANA USE, EPISODIC: ICD-10-CM

## 2025-08-29 DIAGNOSIS — F32.0 MAJOR DEPRESSIVE DISORDER, SINGLE EPISODE, MILD: ICD-10-CM

## 2025-08-29 DIAGNOSIS — Z87.891 PERSONAL HISTORY OF TOBACCO USE: ICD-10-CM

## 2025-08-29 DIAGNOSIS — E03.9 HYPOTHYROIDISM, UNSPECIFIED TYPE: ICD-10-CM

## 2025-08-29 DIAGNOSIS — Z12.4 SCREENING FOR CERVICAL CANCER: ICD-10-CM

## 2025-08-29 DIAGNOSIS — Z12.31 VISIT FOR SCREENING MAMMOGRAM: ICD-10-CM

## 2025-08-29 DIAGNOSIS — Z12.11 SCREEN FOR COLON CANCER: ICD-10-CM

## 2025-08-29 DIAGNOSIS — Z87.09 HX OF BRONCHITIS: ICD-10-CM

## 2025-08-29 DIAGNOSIS — Z11.51 ENCOUNTER FOR SCREENING FOR HUMAN PAPILLOMAVIRUS (HPV): ICD-10-CM

## 2025-08-29 DIAGNOSIS — F41.9 ANXIETY: ICD-10-CM

## 2025-08-29 DIAGNOSIS — R21 RASH AND NONSPECIFIC SKIN ERUPTION: ICD-10-CM

## 2025-08-29 DIAGNOSIS — Z72.0 TOBACCO ABUSE: ICD-10-CM

## 2025-08-29 DIAGNOSIS — K63.5 POLYP OF COLON, UNSPECIFIED PART OF COLON, UNSPECIFIED TYPE: ICD-10-CM

## 2025-08-29 DIAGNOSIS — R42 DIZZINESS: ICD-10-CM

## 2025-08-29 LAB
CHOLEST SERPL-MCNC: 228 MG/DL
EST. AVERAGE GLUCOSE BLD GHB EST-MCNC: 111 MG/DL
FASTING STATUS PATIENT QL REPORTED: NO
HBA1C MFR BLD: 5.5 %
HDLC SERPL-MCNC: 41 MG/DL
LDLC SERPL CALC-MCNC: 138 MG/DL
NONHDLC SERPL-MCNC: 187 MG/DL
TRIGL SERPL-MCNC: 245 MG/DL
TSH SERPL DL<=0.005 MIU/L-ACNC: 0.88 UIU/ML (ref 0.3–4.2)

## 2025-08-29 PROCEDURE — 36415 COLL VENOUS BLD VENIPUNCTURE: CPT

## 2025-08-29 PROCEDURE — 84443 ASSAY THYROID STIM HORMONE: CPT

## 2025-08-29 PROCEDURE — 83036 HEMOGLOBIN GLYCOSYLATED A1C: CPT

## 2025-08-29 PROCEDURE — 80061 LIPID PANEL: CPT

## 2025-08-29 RX ORDER — MIRTAZAPINE 15 MG/1
15 TABLET, FILM COATED ORAL AT BEDTIME
COMMUNITY
Start: 2025-08-15

## 2025-08-29 RX ORDER — OMEPRAZOLE 20 MG/1
20 CAPSULE, DELAYED RELEASE ORAL EVERY OTHER DAY
Qty: 45 CAPSULE | Refills: 2 | Status: CANCELLED | OUTPATIENT
Start: 2025-08-29

## 2025-08-29 RX ORDER — ALBUTEROL SULFATE 90 UG/1
2 AEROSOL, METERED RESPIRATORY (INHALATION) EVERY 4 HOURS PRN
Qty: 18 G | Refills: 0 | Status: SHIPPED | OUTPATIENT
Start: 2025-08-29 | End: 2025-08-29

## 2025-08-29 RX ORDER — LEVOTHYROXINE SODIUM 125 UG/1
125 TABLET ORAL DAILY
Qty: 90 TABLET | Refills: 3 | Status: SHIPPED | OUTPATIENT
Start: 2025-08-29 | End: 2025-08-29

## 2025-08-29 RX ORDER — TRIAMCINOLONE ACETONIDE 1 MG/G
CREAM TOPICAL 2 TIMES DAILY
Qty: 30 G | Refills: 0 | Status: SHIPPED | OUTPATIENT
Start: 2025-08-29

## 2025-08-29 RX ORDER — ALBUTEROL SULFATE 90 UG/1
2 AEROSOL, METERED RESPIRATORY (INHALATION) EVERY 4 HOURS PRN
Qty: 18 G | Refills: 0 | Status: SHIPPED | OUTPATIENT
Start: 2025-08-29

## 2025-08-29 RX ORDER — OMEPRAZOLE 10 MG/1
20 CAPSULE, DELAYED RELEASE ORAL DAILY
Qty: 180 CAPSULE | Refills: 2 | Status: SHIPPED | OUTPATIENT
Start: 2025-08-29 | End: 2025-08-29

## 2025-08-29 RX ORDER — LEVOTHYROXINE SODIUM 125 UG/1
125 TABLET ORAL DAILY
Qty: 90 TABLET | Refills: 3 | Status: SHIPPED | OUTPATIENT
Start: 2025-08-29

## 2025-08-29 RX ORDER — OMEPRAZOLE 10 MG/1
20 CAPSULE, DELAYED RELEASE ORAL DAILY
Qty: 180 CAPSULE | Refills: 2 | Status: SHIPPED | OUTPATIENT
Start: 2025-08-29

## 2025-08-29 RX ORDER — ROSUVASTATIN CALCIUM 5 MG/1
5 TABLET, COATED ORAL AT BEDTIME
COMMUNITY

## 2025-08-29 ASSESSMENT — PAIN SCALES - GENERAL: PAINLEVEL_OUTOF10: NO PAIN (0)

## 2025-09-01 ENCOUNTER — PATIENT OUTREACH (OUTPATIENT)
Dept: CARE COORDINATION | Facility: CLINIC | Age: 65
End: 2025-09-01
Payer: COMMERCIAL

## 2025-09-02 LAB
HPV HR 12 DNA CVX QL NAA+PROBE: NEGATIVE
HPV16 DNA CVX QL NAA+PROBE: NEGATIVE
HPV18 DNA CVX QL NAA+PROBE: NEGATIVE
HUMAN PAPILLOMA VIRUS FINAL DIAGNOSIS: NORMAL

## 2025-09-02 RX ORDER — ROSUVASTATIN CALCIUM 10 MG/1
10 TABLET, COATED ORAL DAILY
Qty: 60 TABLET | Refills: 0 | Status: SHIPPED | OUTPATIENT
Start: 2025-09-02

## 2025-09-03 ENCOUNTER — PATIENT OUTREACH (OUTPATIENT)
Dept: CARE COORDINATION | Facility: CLINIC | Age: 65
End: 2025-09-03
Payer: COMMERCIAL

## 2025-09-04 LAB
BKR AP ASSOCIATED HPV REPORT: NORMAL
BKR LAB AP GYN ADEQUACY: NORMAL
BKR LAB AP GYN INTERPRETATION: NORMAL
BKR LAB AP PREVIOUS ABNORMAL: NORMAL
PATH REPORT.COMMENTS IMP SPEC: NORMAL
PATH REPORT.COMMENTS IMP SPEC: NORMAL
PATH REPORT.RELEVANT HX SPEC: NORMAL

## (undated) DEVICE — CLIP APPLIER ENDO ROTATING 10MM MED/LG ER320

## (undated) DEVICE — ENDO TROCAR FIRST ENTRY KII FIOS Z-THRD 05X100MM CTF03

## (undated) DEVICE — ENDO POUCH UNIV RETRIEVAL SYSTEM INZII 10MM CD001

## (undated) DEVICE — NDL INSUFFLATION 13GA 120MM C2201

## (undated) DEVICE — SU DERMABOND PROPEN .5ML DPP6

## (undated) DEVICE — ESU HOOK TIP 5MM CONMED

## (undated) DEVICE — GLOVE PROTEXIS W/NEU-THERA 7.5  2D73TE75

## (undated) DEVICE — SYR 10ML PREFILLED 0.9% NACL INJ NOT STERILE 306500

## (undated) DEVICE — ENDO TROCAR FIRST ENTRY KII FIOS Z-THRD 11X100MM CTF33

## (undated) DEVICE — ENDO TROCAR SLEEVE KII Z-THREADED 05X100MM CTS02

## (undated) DEVICE — DEVICE SUTURE GRASPER TROCAR CLOSURE 14GA PMITCSG

## (undated) DEVICE — SOL NACL 0.9% INJ 1000ML BAG 07983-09

## (undated) DEVICE — SU MONOCRYL 4-0 PS-2 18" UND Y496G

## (undated) DEVICE — ESU SUCTION/IRRIGATION SYSTEM PISTOL GRIP

## (undated) DEVICE — ESU GROUND PAD UNIVERSAL W/O CORD

## (undated) DEVICE — PACK GENERAL LAPAOSCOPY

## (undated) DEVICE — ESU ENDO SCISSORS 5MM CVD 5DCS

## (undated) DEVICE — GLOVE PROTEXIS BLUE W/NEU-THERA 8.0  2D73EB80

## (undated) RX ORDER — BUPIVACAINE HYDROCHLORIDE AND EPINEPHRINE 5; 5 MG/ML; UG/ML
INJECTION, SOLUTION EPIDURAL; INTRACAUDAL; PERINEURAL
Status: DISPENSED
Start: 2019-02-25

## (undated) RX ORDER — KETOROLAC TROMETHAMINE 30 MG/ML
INJECTION, SOLUTION INTRAMUSCULAR; INTRAVENOUS
Status: DISPENSED
Start: 2019-02-25

## (undated) RX ORDER — PROPOFOL 10 MG/ML
INJECTION, EMULSION INTRAVENOUS
Status: DISPENSED
Start: 2019-02-25